# Patient Record
Sex: MALE | Race: WHITE | NOT HISPANIC OR LATINO | Employment: OTHER | URBAN - METROPOLITAN AREA
[De-identification: names, ages, dates, MRNs, and addresses within clinical notes are randomized per-mention and may not be internally consistent; named-entity substitution may affect disease eponyms.]

---

## 2023-01-01 ENCOUNTER — APPOINTMENT (OUTPATIENT)
Dept: ULTRASOUND IMAGING | Facility: CLINIC | Age: 73
DRG: 189 | End: 2023-01-01
Attending: EMERGENCY MEDICINE
Payer: OTHER MISCELLANEOUS

## 2023-01-01 ENCOUNTER — HOSPITAL ENCOUNTER (INPATIENT)
Facility: CLINIC | Age: 73
LOS: 1 days | End: 2023-03-10
Attending: STUDENT IN AN ORGANIZED HEALTH CARE EDUCATION/TRAINING PROGRAM | Admitting: STUDENT IN AN ORGANIZED HEALTH CARE EDUCATION/TRAINING PROGRAM
Payer: OTHER MISCELLANEOUS

## 2023-01-01 ENCOUNTER — MEDICAL CORRESPONDENCE (OUTPATIENT)
Dept: HEALTH INFORMATION MANAGEMENT | Facility: CLINIC | Age: 73
End: 2023-01-01

## 2023-01-01 ENCOUNTER — APPOINTMENT (OUTPATIENT)
Dept: GENERAL RADIOLOGY | Facility: CLINIC | Age: 73
DRG: 189 | End: 2023-01-01
Attending: EMERGENCY MEDICINE
Payer: OTHER MISCELLANEOUS

## 2023-01-01 ENCOUNTER — HOSPITAL ENCOUNTER (INPATIENT)
Facility: CLINIC | Age: 73
LOS: 1 days | Discharge: HOSPICE | DRG: 189 | End: 2023-03-09
Attending: EMERGENCY MEDICINE | Admitting: STUDENT IN AN ORGANIZED HEALTH CARE EDUCATION/TRAINING PROGRAM
Payer: OTHER MISCELLANEOUS

## 2023-01-01 ENCOUNTER — APPOINTMENT (OUTPATIENT)
Dept: CT IMAGING | Facility: CLINIC | Age: 73
DRG: 189 | End: 2023-01-01
Attending: EMERGENCY MEDICINE
Payer: OTHER MISCELLANEOUS

## 2023-01-01 ENCOUNTER — TRANSFERRED RECORDS (OUTPATIENT)
Dept: HEALTH INFORMATION MANAGEMENT | Facility: CLINIC | Age: 73
End: 2023-01-01

## 2023-01-01 VITALS
RESPIRATION RATE: 21 BRPM | TEMPERATURE: 97.9 F | OXYGEN SATURATION: 96 % | BODY MASS INDEX: 17.14 KG/M2 | SYSTOLIC BLOOD PRESSURE: 127 MMHG | HEIGHT: 69 IN | HEART RATE: 68 BPM | WEIGHT: 115.74 LBS | DIASTOLIC BLOOD PRESSURE: 65 MMHG

## 2023-01-01 VITALS — RESPIRATION RATE: 23 BRPM

## 2023-01-01 DIAGNOSIS — A41.9 SEPSIS WITH ACUTE RENAL FAILURE WITHOUT SEPTIC SHOCK, DUE TO UNSPECIFIED ORGANISM, UNSPECIFIED ACUTE RENAL FAILURE TYPE (H): ICD-10-CM

## 2023-01-01 DIAGNOSIS — Z20.822 LAB TEST NEGATIVE FOR COVID-19 VIRUS: ICD-10-CM

## 2023-01-01 DIAGNOSIS — R65.20 SEPSIS WITH ACUTE RENAL FAILURE WITHOUT SEPTIC SHOCK, DUE TO UNSPECIFIED ORGANISM, UNSPECIFIED ACUTE RENAL FAILURE TYPE (H): ICD-10-CM

## 2023-01-01 DIAGNOSIS — N17.9 SEPSIS WITH ACUTE RENAL FAILURE WITHOUT SEPTIC SHOCK, DUE TO UNSPECIFIED ORGANISM, UNSPECIFIED ACUTE RENAL FAILURE TYPE (H): ICD-10-CM

## 2023-01-01 DIAGNOSIS — J96.01 ACUTE RESPIRATORY FAILURE WITH HYPOXIA (H): ICD-10-CM

## 2023-01-01 LAB
ALBUMIN SERPL BCG-MCNC: 3.9 G/DL (ref 3.5–5.2)
ALBUMIN UR-MCNC: 70 MG/DL
ALLEN'S TEST: YES
ALP SERPL-CCNC: 112 U/L (ref 40–129)
ALT SERPL W P-5'-P-CCNC: 18 U/L (ref 10–50)
ANION GAP SERPL CALCULATED.3IONS-SCNC: 17 MMOL/L (ref 7–15)
APPEARANCE UR: ABNORMAL
AST SERPL W P-5'-P-CCNC: 29 U/L (ref 10–50)
ATRIAL RATE - MUSE: 70 BPM
BASE EXCESS BLDA CALC-SCNC: -3.6 MMOL/L (ref -9–1.8)
BASOPHILS # BLD AUTO: 0 10E3/UL (ref 0–0.2)
BASOPHILS NFR BLD AUTO: 0 %
BILIRUB SERPL-MCNC: 1 MG/DL
BILIRUB UR QL STRIP: NEGATIVE
BUN SERPL-MCNC: 70.8 MG/DL (ref 8–23)
CA-I BLD-MCNC: 5.1 MG/DL (ref 4.4–5.2)
CALCIUM SERPL-MCNC: 9.5 MG/DL (ref 8.8–10.2)
CHLORIDE SERPL-SCNC: 105 MMOL/L (ref 98–107)
COLOR UR AUTO: YELLOW
CPB POCT: NO
CREAT SERPL-MCNC: 4.07 MG/DL (ref 0.67–1.17)
DEPRECATED HCO3 PLAS-SCNC: 19 MMOL/L (ref 22–29)
DIASTOLIC BLOOD PRESSURE - MUSE: NORMAL MMHG
EOSINOPHIL # BLD AUTO: 0 10E3/UL (ref 0–0.7)
EOSINOPHIL NFR BLD AUTO: 0 %
ERYTHROCYTE [DISTWIDTH] IN BLOOD BY AUTOMATED COUNT: 21.4 % (ref 10–15)
FLUAV RNA SPEC QL NAA+PROBE: NEGATIVE
FLUBV RNA RESP QL NAA+PROBE: NEGATIVE
GFR SERPL CREATININE-BSD FRML MDRD: 15 ML/MIN/1.73M2
GLUCOSE BLD-MCNC: 198 MG/DL (ref 70–99)
GLUCOSE BLDC GLUCOMTR-MCNC: 167 MG/DL (ref 70–99)
GLUCOSE BLDC GLUCOMTR-MCNC: 175 MG/DL (ref 70–99)
GLUCOSE BLDC GLUCOMTR-MCNC: 215 MG/DL (ref 70–99)
GLUCOSE BLDC GLUCOMTR-MCNC: 224 MG/DL (ref 70–99)
GLUCOSE BLDC GLUCOMTR-MCNC: 238 MG/DL (ref 70–99)
GLUCOSE SERPL-MCNC: 200 MG/DL (ref 70–99)
GLUCOSE UR STRIP-MCNC: 70 MG/DL
HCO3 BLD-SCNC: 23 MMOL/L (ref 21–28)
HCO3 BLDV-SCNC: 21 MMOL/L (ref 21–28)
HCO3 BLDV-SCNC: 21 MMOL/L (ref 21–28)
HCT VFR BLD AUTO: 40.7 % (ref 40–53)
HCT VFR BLD CALC: 40 % (ref 40–53)
HGB BLD-MCNC: 12.9 G/DL (ref 13.3–17.7)
HGB BLD-MCNC: 13.6 G/DL (ref 13.3–17.7)
HGB UR QL STRIP: ABNORMAL
HOLD SPECIMEN: NORMAL
HOLD SPECIMEN: NORMAL
HYALINE CASTS: 21 /LPF
IMM GRANULOCYTES # BLD: 0 10E3/UL
IMM GRANULOCYTES NFR BLD: 0 %
INTERPRETATION ECG - MUSE: NORMAL
KETONES UR STRIP-MCNC: NEGATIVE MG/DL
LACTATE BLD-SCNC: 2.2 MMOL/L
LACTATE SERPL-SCNC: 2.4 MMOL/L (ref 0.7–2)
LACTATE SERPL-SCNC: 4.7 MMOL/L (ref 0.7–2)
LEUKOCYTE ESTERASE UR QL STRIP: NEGATIVE
LYMPHOCYTES # BLD AUTO: 0.7 10E3/UL (ref 0.8–5.3)
LYMPHOCYTES NFR BLD AUTO: 6 %
MCH RBC QN AUTO: 25.5 PG (ref 26.5–33)
MCHC RBC AUTO-ENTMCNC: 31.7 G/DL (ref 31.5–36.5)
MCV RBC AUTO: 81 FL (ref 78–100)
MONOCYTES # BLD AUTO: 0.5 10E3/UL (ref 0–1.3)
MONOCYTES NFR BLD AUTO: 4 %
MUCOUS THREADS #/AREA URNS LPF: PRESENT /LPF
NEUTROPHILS # BLD AUTO: 11.3 10E3/UL (ref 1.6–8.3)
NEUTROPHILS NFR BLD AUTO: 90 %
NITRATE UR QL: NEGATIVE
NRBC # BLD AUTO: 0 10E3/UL
NRBC BLD AUTO-RTO: 0 /100
O2/TOTAL GAS SETTING VFR VENT: 50 %
P AXIS - MUSE: 90 DEGREES
PCO2 BLD: 44 MM HG (ref 35–45)
PCO2 BLDV: 40 MM HG (ref 40–50)
PCO2 BLDV: 40 MM HG (ref 40–50)
PH BLD: 7.32 [PH] (ref 7.35–7.45)
PH BLDV: 7.32 [PH] (ref 7.32–7.43)
PH BLDV: 7.34 [PH] (ref 7.32–7.43)
PH UR STRIP: 5.5 [PH] (ref 5–7)
PLATELET # BLD AUTO: 298 10E3/UL (ref 150–450)
PO2 BLD: 34 MM HG (ref 80–105)
PO2 BLDV: 57 MM HG (ref 25–47)
PO2 BLDV: 59 MM HG (ref 25–47)
POTASSIUM BLD-SCNC: 5.6 MMOL/L (ref 3.4–5.3)
POTASSIUM SERPL-SCNC: 5.5 MMOL/L (ref 3.4–5.3)
POTASSIUM SERPL-SCNC: 5.7 MMOL/L (ref 3.4–5.3)
PR INTERVAL - MUSE: 170 MS
PROCALCITONIN SERPL IA-MCNC: 1.86 NG/ML
PROT SERPL-MCNC: 7 G/DL (ref 6.4–8.3)
QRS DURATION - MUSE: 116 MS
QT - MUSE: 432 MS
QTC - MUSE: 466 MS
R AXIS - MUSE: -46 DEGREES
RBC # BLD AUTO: 5.05 10E6/UL (ref 4.4–5.9)
RBC URINE: >182 /HPF
RSV RNA SPEC NAA+PROBE: NEGATIVE
SAO2 % BLDV: 88 % (ref 94–100)
SAO2 % BLDV: 88 % (ref 94–100)
SARS-COV-2 RNA RESP QL NAA+PROBE: NEGATIVE
SODIUM BLD-SCNC: 144 MMOL/L (ref 133–144)
SODIUM SERPL-SCNC: 141 MMOL/L (ref 136–145)
SP GR UR STRIP: 1.02 (ref 1–1.03)
SYSTOLIC BLOOD PRESSURE - MUSE: NORMAL MMHG
T AXIS - MUSE: 78 DEGREES
TROPONIN T SERPL HS-MCNC: 53 NG/L
UROBILINOGEN UR STRIP-MCNC: NORMAL MG/DL
VENTRICULAR RATE- MUSE: 70 BPM
WBC # BLD AUTO: 12.6 10E3/UL (ref 4–11)
WBC URINE: 1 /HPF

## 2023-01-01 PROCEDURE — 82962 GLUCOSE BLOOD TEST: CPT

## 2023-01-01 PROCEDURE — 94640 AIRWAY INHALATION TREATMENT: CPT | Mod: 76

## 2023-01-01 PROCEDURE — 87040 BLOOD CULTURE FOR BACTERIA: CPT | Performed by: EMERGENCY MEDICINE

## 2023-01-01 PROCEDURE — 74176 CT ABD & PELVIS W/O CONTRAST: CPT | Mod: 26 | Performed by: RADIOLOGY

## 2023-01-01 PROCEDURE — 85025 COMPLETE CBC W/AUTO DIFF WBC: CPT | Performed by: EMERGENCY MEDICINE

## 2023-01-01 PROCEDURE — 250N000009 HC RX 250

## 2023-01-01 PROCEDURE — 999N000157 HC STATISTIC RCP TIME EA 10 MIN

## 2023-01-01 PROCEDURE — 250N000011 HC RX IP 250 OP 636: Performed by: STUDENT IN AN ORGANIZED HEALTH CARE EDUCATION/TRAINING PROGRAM

## 2023-01-01 PROCEDURE — 82947 ASSAY GLUCOSE BLOOD QUANT: CPT | Performed by: EMERGENCY MEDICINE

## 2023-01-01 PROCEDURE — 250N000013 HC RX MED GY IP 250 OP 250 PS 637: Performed by: EMERGENCY MEDICINE

## 2023-01-01 PROCEDURE — C9803 HOPD COVID-19 SPEC COLLECT: HCPCS | Performed by: EMERGENCY MEDICINE

## 2023-01-01 PROCEDURE — 258N000001 HC RX 258: Performed by: EMERGENCY MEDICINE

## 2023-01-01 PROCEDURE — 250N000011 HC RX IP 250 OP 636

## 2023-01-01 PROCEDURE — 250N000009 HC RX 250: Performed by: EMERGENCY MEDICINE

## 2023-01-01 PROCEDURE — 94640 AIRWAY INHALATION TREATMENT: CPT

## 2023-01-01 PROCEDURE — 93970 EXTREMITY STUDY: CPT

## 2023-01-01 PROCEDURE — 82803 BLOOD GASES ANY COMBINATION: CPT

## 2023-01-01 PROCEDURE — 84145 PROCALCITONIN (PCT): CPT | Performed by: EMERGENCY MEDICINE

## 2023-01-01 PROCEDURE — 93010 ELECTROCARDIOGRAM REPORT: CPT | Mod: GV | Performed by: EMERGENCY MEDICINE

## 2023-01-01 PROCEDURE — 80053 COMPREHEN METABOLIC PANEL: CPT

## 2023-01-01 PROCEDURE — 5A09357 ASSISTANCE WITH RESPIRATORY VENTILATION, LESS THAN 24 CONSECUTIVE HOURS, CONTINUOUS POSITIVE AIRWAY PRESSURE: ICD-10-PCS | Performed by: EMERGENCY MEDICINE

## 2023-01-01 PROCEDURE — 99232 SBSQ HOSP IP/OBS MODERATE 35: CPT | Mod: GC | Performed by: STUDENT IN AN ORGANIZED HEALTH CARE EDUCATION/TRAINING PROGRAM

## 2023-01-01 PROCEDURE — 250N000011 HC RX IP 250 OP 636: Performed by: EMERGENCY MEDICINE

## 2023-01-01 PROCEDURE — 258N000003 HC RX IP 258 OP 636: Performed by: EMERGENCY MEDICINE

## 2023-01-01 PROCEDURE — 93005 ELECTROCARDIOGRAM TRACING: CPT | Performed by: EMERGENCY MEDICINE

## 2023-01-01 PROCEDURE — 84484 ASSAY OF TROPONIN QUANT: CPT | Performed by: EMERGENCY MEDICINE

## 2023-01-01 PROCEDURE — 83605 ASSAY OF LACTIC ACID: CPT | Performed by: EMERGENCY MEDICINE

## 2023-01-01 PROCEDURE — 36415 COLL VENOUS BLD VENIPUNCTURE: CPT | Performed by: EMERGENCY MEDICINE

## 2023-01-01 PROCEDURE — 83605 ASSAY OF LACTIC ACID: CPT

## 2023-01-01 PROCEDURE — 94660 CPAP INITIATION&MGMT: CPT

## 2023-01-01 PROCEDURE — 84132 ASSAY OF SERUM POTASSIUM: CPT | Performed by: EMERGENCY MEDICINE

## 2023-01-01 PROCEDURE — 99223 1ST HOSP IP/OBS HIGH 75: CPT | Mod: GC | Performed by: STUDENT IN AN ORGANIZED HEALTH CARE EDUCATION/TRAINING PROGRAM

## 2023-01-01 PROCEDURE — 81003 URINALYSIS AUTO W/O SCOPE: CPT | Performed by: EMERGENCY MEDICINE

## 2023-01-01 PROCEDURE — 99291 CRITICAL CARE FIRST HOUR: CPT | Mod: 25 | Performed by: EMERGENCY MEDICINE

## 2023-01-01 PROCEDURE — 120N000005 HC R&B MS OVERFLOW UMMC

## 2023-01-01 PROCEDURE — 74176 CT ABD & PELVIS W/O CONTRAST: CPT

## 2023-01-01 PROCEDURE — 82947 ASSAY GLUCOSE BLOOD QUANT: CPT

## 2023-01-01 PROCEDURE — 99291 CRITICAL CARE FIRST HOUR: CPT | Mod: CS,25 | Performed by: EMERGENCY MEDICINE

## 2023-01-01 PROCEDURE — 87637 SARSCOV2&INF A&B&RSV AMP PRB: CPT | Performed by: EMERGENCY MEDICINE

## 2023-01-01 PROCEDURE — 71045 X-RAY EXAM CHEST 1 VIEW: CPT | Mod: 26 | Performed by: RADIOLOGY

## 2023-01-01 PROCEDURE — 96360 HYDRATION IV INFUSION INIT: CPT | Performed by: EMERGENCY MEDICINE

## 2023-01-01 PROCEDURE — 71045 X-RAY EXAM CHEST 1 VIEW: CPT

## 2023-01-01 PROCEDURE — 93970 EXTREMITY STUDY: CPT | Mod: 26 | Performed by: RADIOLOGY

## 2023-01-01 PROCEDURE — 110N000005 HC R&B HOSPICE, ACCENT

## 2023-01-01 PROCEDURE — 82803 BLOOD GASES ANY COMBINATION: CPT | Performed by: EMERGENCY MEDICINE

## 2023-01-01 RX ORDER — PIPERACILLIN SODIUM, TAZOBACTAM SODIUM 2; .25 G/10ML; G/10ML
2.25 INJECTION, POWDER, LYOPHILIZED, FOR SOLUTION INTRAVENOUS EVERY 6 HOURS
Status: DISCONTINUED | OUTPATIENT
Start: 2023-01-01 | End: 2023-01-01

## 2023-01-01 RX ORDER — HYDROMORPHONE HYDROCHLORIDE 5 MG/5ML
1 SOLUTION ORAL
Status: DISCONTINUED | OUTPATIENT
Start: 2023-01-01 | End: 2023-01-01 | Stop reason: HOSPADM

## 2023-01-01 RX ORDER — ACETAMINOPHEN 325 MG/1
650 TABLET ORAL EVERY 6 HOURS PRN
COMMUNITY

## 2023-01-01 RX ORDER — HYDROMORPHONE HYDROCHLORIDE 5 MG/5ML
2 SOLUTION ORAL
Status: DISCONTINUED | OUTPATIENT
Start: 2023-01-01 | End: 2023-03-11 | Stop reason: HOSPADM

## 2023-01-01 RX ORDER — LORAZEPAM 2 MG/ML
0.5 INJECTION INTRAMUSCULAR
Status: DISCONTINUED | OUTPATIENT
Start: 2023-01-01 | End: 2023-03-11 | Stop reason: HOSPADM

## 2023-01-01 RX ORDER — LEVALBUTEROL INHALATION SOLUTION 1.25 MG/3ML
1.25 SOLUTION RESPIRATORY (INHALATION) EVERY 6 HOURS PRN
Status: DISCONTINUED | OUTPATIENT
Start: 2023-01-01 | End: 2023-03-11 | Stop reason: HOSPADM

## 2023-01-01 RX ORDER — HEPARIN SODIUM 5000 [USP'U]/.5ML
5000 INJECTION, SOLUTION INTRAVENOUS; SUBCUTANEOUS EVERY 12 HOURS
Status: DISCONTINUED | OUTPATIENT
Start: 2023-01-01 | End: 2023-01-01

## 2023-01-01 RX ORDER — NALOXONE HYDROCHLORIDE 0.4 MG/ML
0.1 INJECTION, SOLUTION INTRAMUSCULAR; INTRAVENOUS; SUBCUTANEOUS
Status: DISCONTINUED | OUTPATIENT
Start: 2023-01-01 | End: 2023-01-01

## 2023-01-01 RX ORDER — POLYETHYLENE GLYCOL 3350 17 G/17G
17 POWDER, FOR SOLUTION ORAL DAILY PRN
Status: DISCONTINUED | OUTPATIENT
Start: 2023-01-01 | End: 2023-01-01 | Stop reason: HOSPADM

## 2023-01-01 RX ORDER — ALBUTEROL SULFATE 0.83 MG/ML
2.5 SOLUTION RESPIRATORY (INHALATION)
Status: DISCONTINUED | OUTPATIENT
Start: 2023-01-01 | End: 2023-01-01

## 2023-01-01 RX ORDER — GLYCOPYRROLATE 0.2 MG/ML
0.2 INJECTION, SOLUTION INTRAMUSCULAR; INTRAVENOUS EVERY 4 HOURS PRN
Status: DISCONTINUED | OUTPATIENT
Start: 2023-01-01 | End: 2023-03-11 | Stop reason: HOSPADM

## 2023-01-01 RX ORDER — SALIVA STIMULANT COMB. NO.3
1 SPRAY, NON-AEROSOL (ML) MUCOUS MEMBRANE
Status: CANCELLED | OUTPATIENT
Start: 2023-01-01

## 2023-01-01 RX ORDER — IPRATROPIUM BROMIDE AND ALBUTEROL SULFATE 2.5; .5 MG/3ML; MG/3ML
3 SOLUTION RESPIRATORY (INHALATION) ONCE
Status: COMPLETED | OUTPATIENT
Start: 2023-01-01 | End: 2023-01-01

## 2023-01-01 RX ORDER — LORAZEPAM 1 MG/1
1 TABLET ORAL
Status: DISCONTINUED | OUTPATIENT
Start: 2023-01-01 | End: 2023-03-11 | Stop reason: HOSPADM

## 2023-01-01 RX ORDER — LISINOPRIL 10 MG/1
10 TABLET ORAL DAILY
COMMUNITY

## 2023-01-01 RX ORDER — OLANZAPINE 5 MG/1
5 TABLET, ORALLY DISINTEGRATING ORAL EVERY 6 HOURS PRN
Status: DISCONTINUED | OUTPATIENT
Start: 2023-01-01 | End: 2023-01-01 | Stop reason: HOSPADM

## 2023-01-01 RX ORDER — LORAZEPAM 1 MG/1
1 TABLET ORAL
Status: DISCONTINUED | OUTPATIENT
Start: 2023-01-01 | End: 2023-01-01 | Stop reason: HOSPADM

## 2023-01-01 RX ORDER — CARBOXYMETHYLCELLULOSE SODIUM 5 MG/ML
1 SOLUTION/ DROPS OPHTHALMIC
Status: DISCONTINUED | OUTPATIENT
Start: 2023-01-01 | End: 2023-01-01

## 2023-01-01 RX ORDER — GLYCOPYRROLATE 1 MG/1
2 TABLET ORAL EVERY 4 HOURS PRN
Status: DISCONTINUED | OUTPATIENT
Start: 2023-01-01 | End: 2023-03-11 | Stop reason: HOSPADM

## 2023-01-01 RX ORDER — ALBUTEROL SULFATE 0.83 MG/ML
2.5 SOLUTION RESPIRATORY (INHALATION)
Status: CANCELLED | OUTPATIENT
Start: 2023-01-01

## 2023-01-01 RX ORDER — DEXTROSE MONOHYDRATE 25 G/50ML
25 INJECTION, SOLUTION INTRAVENOUS ONCE
Status: COMPLETED | OUTPATIENT
Start: 2023-01-01 | End: 2023-01-01

## 2023-01-01 RX ORDER — NALOXONE HYDROCHLORIDE 0.4 MG/ML
0.1 INJECTION, SOLUTION INTRAMUSCULAR; INTRAVENOUS; SUBCUTANEOUS
Status: CANCELLED | OUTPATIENT
Start: 2023-01-01

## 2023-01-01 RX ORDER — MINERAL OIL/HYDROPHIL PETROLAT
OINTMENT (GRAM) TOPICAL
Status: CANCELLED | OUTPATIENT
Start: 2023-01-01

## 2023-01-01 RX ORDER — LORAZEPAM 2 MG/ML
0.5 INJECTION INTRAMUSCULAR
Status: CANCELLED | OUTPATIENT
Start: 2023-01-01

## 2023-01-01 RX ORDER — HYDROMORPHONE HYDROCHLORIDE 1 MG/ML
1 SOLUTION ORAL
Status: DISCONTINUED | OUTPATIENT
Start: 2023-01-01 | End: 2023-03-11 | Stop reason: HOSPADM

## 2023-01-01 RX ORDER — HALOPERIDOL 5 MG/ML
1 INJECTION INTRAMUSCULAR
Status: DISCONTINUED | OUTPATIENT
Start: 2023-01-01 | End: 2023-03-11 | Stop reason: HOSPADM

## 2023-01-01 RX ORDER — DULOXETINE 40 MG/1
1 CAPSULE, DELAYED RELEASE ORAL DAILY
COMMUNITY

## 2023-01-01 RX ORDER — DEXTROSE MONOHYDRATE 25 G/50ML
25-50 INJECTION, SOLUTION INTRAVENOUS
Status: DISCONTINUED | OUTPATIENT
Start: 2023-01-01 | End: 2023-01-01 | Stop reason: HOSPADM

## 2023-01-01 RX ORDER — GLYCOPYRROLATE 1 MG/1
2 TABLET ORAL EVERY 4 HOURS PRN
Status: DISCONTINUED | OUTPATIENT
Start: 2023-01-01 | End: 2023-01-01 | Stop reason: HOSPADM

## 2023-01-01 RX ORDER — ACETAMINOPHEN 325 MG/1
650 TABLET ORAL EVERY 6 HOURS PRN
Status: DISCONTINUED | OUTPATIENT
Start: 2023-01-01 | End: 2023-01-01 | Stop reason: HOSPADM

## 2023-01-01 RX ORDER — SALIVA STIMULANT COMB. NO.3
1 SPRAY, NON-AEROSOL (ML) MUCOUS MEMBRANE
Status: DISCONTINUED | OUTPATIENT
Start: 2023-01-01 | End: 2023-01-01 | Stop reason: HOSPADM

## 2023-01-01 RX ORDER — HYDROMORPHONE HYDROCHLORIDE 1 MG/ML
0.5 INJECTION, SOLUTION INTRAMUSCULAR; INTRAVENOUS; SUBCUTANEOUS
Status: DISCONTINUED | OUTPATIENT
Start: 2023-01-01 | End: 2023-01-01 | Stop reason: HOSPADM

## 2023-01-01 RX ORDER — NALOXONE HYDROCHLORIDE 0.4 MG/ML
0.2 INJECTION, SOLUTION INTRAMUSCULAR; INTRAVENOUS; SUBCUTANEOUS
Status: DISCONTINUED | OUTPATIENT
Start: 2023-01-01 | End: 2023-01-01

## 2023-01-01 RX ORDER — HYDROMORPHONE HYDROCHLORIDE 1 MG/ML
0.5 INJECTION, SOLUTION INTRAMUSCULAR; INTRAVENOUS; SUBCUTANEOUS
Status: DISCONTINUED | OUTPATIENT
Start: 2023-01-01 | End: 2023-03-11 | Stop reason: HOSPADM

## 2023-01-01 RX ORDER — AMOXICILLIN 250 MG
2 CAPSULE ORAL 2 TIMES DAILY PRN
Status: DISCONTINUED | OUTPATIENT
Start: 2023-01-01 | End: 2023-01-01 | Stop reason: HOSPADM

## 2023-01-01 RX ORDER — HYDROMORPHONE HYDROCHLORIDE 2 MG/1
2 TABLET ORAL
Status: DISCONTINUED | OUTPATIENT
Start: 2023-01-01 | End: 2023-03-11 | Stop reason: HOSPADM

## 2023-01-01 RX ORDER — HYDROMORPHONE HYDROCHLORIDE 5 MG/5ML
1 SOLUTION ORAL
Status: CANCELLED | OUTPATIENT
Start: 2023-01-01

## 2023-01-01 RX ORDER — IPRATROPIUM BROMIDE AND ALBUTEROL SULFATE 2.5; .5 MG/3ML; MG/3ML
3 SOLUTION RESPIRATORY (INHALATION) ONCE
Status: CANCELLED | OUTPATIENT
Start: 2023-01-01 | End: 2023-01-01

## 2023-01-01 RX ORDER — HYDROMORPHONE HYDROCHLORIDE 1 MG/ML
0.5 INJECTION, SOLUTION INTRAMUSCULAR; INTRAVENOUS; SUBCUTANEOUS
Status: CANCELLED | OUTPATIENT
Start: 2023-01-01

## 2023-01-01 RX ORDER — CARBOXYMETHYLCELLULOSE SODIUM 5 MG/ML
1-2 SOLUTION/ DROPS OPHTHALMIC
Status: DISCONTINUED | OUTPATIENT
Start: 2023-01-01 | End: 2023-03-11 | Stop reason: HOSPADM

## 2023-01-01 RX ORDER — MINERAL OIL/HYDROPHIL PETROLAT
OINTMENT (GRAM) TOPICAL
Status: DISCONTINUED | OUTPATIENT
Start: 2023-01-01 | End: 2023-03-11 | Stop reason: HOSPADM

## 2023-01-01 RX ORDER — BUPROPION HYDROCHLORIDE 200 MG/1
200 TABLET, EXTENDED RELEASE ORAL 2 TIMES DAILY
COMMUNITY

## 2023-01-01 RX ORDER — LORAZEPAM 2 MG/ML
0.5 INJECTION INTRAMUSCULAR
Status: DISCONTINUED | OUTPATIENT
Start: 2023-01-01 | End: 2023-01-01 | Stop reason: HOSPADM

## 2023-01-01 RX ORDER — LIDOCAINE 40 MG/G
CREAM TOPICAL
Status: DISCONTINUED | OUTPATIENT
Start: 2023-01-01 | End: 2023-01-01 | Stop reason: HOSPADM

## 2023-01-01 RX ORDER — HYDROMORPHONE HYDROCHLORIDE 2 MG/1
2 TABLET ORAL
Status: CANCELLED | OUTPATIENT
Start: 2023-01-01

## 2023-01-01 RX ORDER — AMOXICILLIN 250 MG
1 CAPSULE ORAL 2 TIMES DAILY PRN
Status: DISCONTINUED | OUTPATIENT
Start: 2023-01-01 | End: 2023-01-01 | Stop reason: HOSPADM

## 2023-01-01 RX ORDER — IPRATROPIUM BROMIDE AND ALBUTEROL SULFATE 2.5; .5 MG/3ML; MG/3ML
3 SOLUTION RESPIRATORY (INHALATION) ONCE
Status: DISCONTINUED | OUTPATIENT
Start: 2023-01-01 | End: 2023-01-01

## 2023-01-01 RX ORDER — CARBIDOPA AND LEVODOPA 25; 100 MG/1; MG/1
1 TABLET ORAL 3 TIMES DAILY
COMMUNITY

## 2023-01-01 RX ORDER — NALOXONE HYDROCHLORIDE 0.4 MG/ML
0.1 INJECTION, SOLUTION INTRAMUSCULAR; INTRAVENOUS; SUBCUTANEOUS
Status: DISCONTINUED | OUTPATIENT
Start: 2023-01-01 | End: 2023-01-01 | Stop reason: HOSPADM

## 2023-01-01 RX ORDER — NALOXONE HYDROCHLORIDE 0.4 MG/ML
0.4 INJECTION, SOLUTION INTRAMUSCULAR; INTRAVENOUS; SUBCUTANEOUS
Status: DISCONTINUED | OUTPATIENT
Start: 2023-01-01 | End: 2023-03-11 | Stop reason: HOSPADM

## 2023-01-01 RX ORDER — CARBOXYMETHYLCELLULOSE SODIUM 5 MG/ML
1-2 SOLUTION/ DROPS OPHTHALMIC
Status: DISCONTINUED | OUTPATIENT
Start: 2023-01-01 | End: 2023-01-01 | Stop reason: HOSPADM

## 2023-01-01 RX ORDER — HYDROMORPHONE HYDROCHLORIDE 5 MG/5ML
2 SOLUTION ORAL
Status: CANCELLED | OUTPATIENT
Start: 2023-01-01

## 2023-01-01 RX ORDER — SALIVA STIMULANT COMB. NO.3
1 SPRAY, NON-AEROSOL (ML) MUCOUS MEMBRANE
Status: DISCONTINUED | OUTPATIENT
Start: 2023-01-01 | End: 2023-03-11 | Stop reason: HOSPADM

## 2023-01-01 RX ORDER — GLYCOPYRROLATE 0.2 MG/ML
0.2 INJECTION, SOLUTION INTRAMUSCULAR; INTRAVENOUS EVERY 4 HOURS PRN
Status: CANCELLED | OUTPATIENT
Start: 2023-01-01

## 2023-01-01 RX ORDER — NICOTINE POLACRILEX 4 MG
15-30 LOZENGE BUCCAL
Status: DISCONTINUED | OUTPATIENT
Start: 2023-01-01 | End: 2023-01-01 | Stop reason: HOSPADM

## 2023-01-01 RX ORDER — AMANTADINE HYDROCHLORIDE 100 MG/1
1 TABLET ORAL DAILY
COMMUNITY

## 2023-01-01 RX ORDER — PREGABALIN 200 MG/1
200 CAPSULE ORAL 2 TIMES DAILY
COMMUNITY

## 2023-01-01 RX ORDER — NALOXONE HYDROCHLORIDE 0.4 MG/ML
0.2 INJECTION, SOLUTION INTRAMUSCULAR; INTRAVENOUS; SUBCUTANEOUS
Status: CANCELLED | OUTPATIENT
Start: 2023-01-01

## 2023-01-01 RX ORDER — OLANZAPINE 5 MG/1
5 TABLET ORAL AT BEDTIME
COMMUNITY

## 2023-01-01 RX ORDER — GLYCOPYRROLATE 1 MG/1
2 TABLET ORAL EVERY 4 HOURS PRN
Status: CANCELLED | OUTPATIENT
Start: 2023-01-01

## 2023-01-01 RX ORDER — ATROPINE SULFATE 10 MG/ML
2 SOLUTION/ DROPS OPHTHALMIC EVERY 4 HOURS PRN
Status: DISCONTINUED | OUTPATIENT
Start: 2023-01-01 | End: 2023-03-11 | Stop reason: HOSPADM

## 2023-01-01 RX ORDER — ACETAMINOPHEN 325 MG/1
650 TABLET ORAL EVERY 6 HOURS PRN
Status: CANCELLED | OUTPATIENT
Start: 2023-01-01

## 2023-01-01 RX ORDER — LORAZEPAM 0.5 MG/1
1 TABLET ORAL
Status: CANCELLED | OUTPATIENT
Start: 2023-01-01

## 2023-01-01 RX ORDER — HYDROMORPHONE HYDROCHLORIDE 5 MG/5ML
2 SOLUTION ORAL
Status: DISCONTINUED | OUTPATIENT
Start: 2023-01-01 | End: 2023-01-01 | Stop reason: HOSPADM

## 2023-01-01 RX ORDER — ALBUTEROL SULFATE 0.83 MG/ML
2.5 SOLUTION RESPIRATORY (INHALATION)
Status: DISCONTINUED | OUTPATIENT
Start: 2023-01-01 | End: 2023-01-01 | Stop reason: HOSPADM

## 2023-01-01 RX ORDER — GLYCOPYRROLATE 0.2 MG/ML
0.2 INJECTION, SOLUTION INTRAMUSCULAR; INTRAVENOUS EVERY 4 HOURS PRN
Status: DISCONTINUED | OUTPATIENT
Start: 2023-01-01 | End: 2023-01-01 | Stop reason: HOSPADM

## 2023-01-01 RX ORDER — AMIODARONE HYDROCHLORIDE 200 MG/1
200 TABLET ORAL DAILY
COMMUNITY

## 2023-01-01 RX ORDER — NALOXONE HYDROCHLORIDE 0.4 MG/ML
0.2 INJECTION, SOLUTION INTRAMUSCULAR; INTRAVENOUS; SUBCUTANEOUS
Status: DISCONTINUED | OUTPATIENT
Start: 2023-01-01 | End: 2023-03-11 | Stop reason: HOSPADM

## 2023-01-01 RX ORDER — HYDROMORPHONE HYDROCHLORIDE 1 MG/ML
0.3 INJECTION, SOLUTION INTRAMUSCULAR; INTRAVENOUS; SUBCUTANEOUS
Status: DISCONTINUED | OUTPATIENT
Start: 2023-01-01 | End: 2023-03-11 | Stop reason: HOSPADM

## 2023-01-01 RX ORDER — HYDROMORPHONE HYDROCHLORIDE 1 MG/ML
0.3 INJECTION, SOLUTION INTRAMUSCULAR; INTRAVENOUS; SUBCUTANEOUS
Status: CANCELLED | OUTPATIENT
Start: 2023-01-01

## 2023-01-01 RX ORDER — HYDROMORPHONE HYDROCHLORIDE 2 MG/1
2 TABLET ORAL
Status: DISCONTINUED | OUTPATIENT
Start: 2023-01-01 | End: 2023-01-01 | Stop reason: HOSPADM

## 2023-01-01 RX ORDER — CALCIUM GLUCONATE 20 MG/ML
1 INJECTION, SOLUTION INTRAVENOUS ONCE
Status: COMPLETED | OUTPATIENT
Start: 2023-01-01 | End: 2023-01-01

## 2023-01-01 RX ORDER — METOPROLOL SUCCINATE 50 MG/1
25 TABLET, EXTENDED RELEASE ORAL DAILY
COMMUNITY

## 2023-01-01 RX ORDER — ALBUTEROL SULFATE 0.83 MG/ML
SOLUTION RESPIRATORY (INHALATION)
Status: COMPLETED
Start: 2023-01-01 | End: 2023-01-01

## 2023-01-01 RX ORDER — ACETAMINOPHEN 650 MG/1
650 SUPPOSITORY RECTAL EVERY 6 HOURS PRN
Status: DISCONTINUED | OUTPATIENT
Start: 2023-01-01 | End: 2023-01-01 | Stop reason: HOSPADM

## 2023-01-01 RX ORDER — HALOPERIDOL 5 MG/ML
1 INJECTION INTRAMUSCULAR
Status: DISCONTINUED | OUTPATIENT
Start: 2023-01-01 | End: 2023-01-01 | Stop reason: HOSPADM

## 2023-01-01 RX ORDER — HALOPERIDOL 5 MG/ML
1 INJECTION INTRAMUSCULAR
Status: CANCELLED | OUTPATIENT
Start: 2023-01-01

## 2023-01-01 RX ORDER — ATORVASTATIN CALCIUM 40 MG/1
40 TABLET, FILM COATED ORAL DAILY
COMMUNITY

## 2023-01-01 RX ORDER — PIPERACILLIN SODIUM, TAZOBACTAM SODIUM 3; .375 G/15ML; G/15ML
3.38 INJECTION, POWDER, LYOPHILIZED, FOR SOLUTION INTRAVENOUS EVERY 6 HOURS
Status: DISCONTINUED | OUTPATIENT
Start: 2023-01-01 | End: 2023-01-01

## 2023-01-01 RX ORDER — CARBOXYMETHYLCELLULOSE SODIUM 5 MG/ML
1 SOLUTION/ DROPS OPHTHALMIC
Status: CANCELLED | OUTPATIENT
Start: 2023-01-01

## 2023-01-01 RX ORDER — MINERAL OIL/HYDROPHIL PETROLAT
OINTMENT (GRAM) TOPICAL
Status: DISCONTINUED | OUTPATIENT
Start: 2023-01-01 | End: 2023-01-01 | Stop reason: HOSPADM

## 2023-01-01 RX ORDER — NICOTINE 21 MG/24HR
1 PATCH, TRANSDERMAL 24 HOURS TRANSDERMAL EVERY 24 HOURS
COMMUNITY

## 2023-01-01 RX ORDER — HYDROMORPHONE HYDROCHLORIDE 1 MG/ML
0.3 INJECTION, SOLUTION INTRAMUSCULAR; INTRAVENOUS; SUBCUTANEOUS
Status: DISCONTINUED | OUTPATIENT
Start: 2023-01-01 | End: 2023-01-01 | Stop reason: HOSPADM

## 2023-01-01 RX ORDER — OLANZAPINE 5 MG/1
5 TABLET, ORALLY DISINTEGRATING ORAL EVERY 6 HOURS PRN
Status: DISCONTINUED | OUTPATIENT
Start: 2023-01-01 | End: 2023-03-11 | Stop reason: HOSPADM

## 2023-01-01 RX ORDER — OMEPRAZOLE 10 MG/1
10 CAPSULE, DELAYED RELEASE ORAL DAILY
COMMUNITY

## 2023-01-01 RX ORDER — CARBOXYMETHYLCELLULOSE SODIUM 5 MG/ML
1-2 SOLUTION/ DROPS OPHTHALMIC
Status: CANCELLED | OUTPATIENT
Start: 2023-01-01

## 2023-01-01 RX ORDER — SODIUM CHLORIDE 9 MG/ML
INJECTION, SOLUTION INTRAVENOUS CONTINUOUS
Status: DISCONTINUED | OUTPATIENT
Start: 2023-01-01 | End: 2023-01-01

## 2023-01-01 RX ORDER — OLANZAPINE 5 MG/1
5 TABLET, ORALLY DISINTEGRATING ORAL EVERY 6 HOURS PRN
Status: CANCELLED | OUTPATIENT
Start: 2023-01-01

## 2023-01-01 RX ORDER — NALOXONE HYDROCHLORIDE 0.4 MG/ML
0.2 INJECTION, SOLUTION INTRAMUSCULAR; INTRAVENOUS; SUBCUTANEOUS
Status: DISCONTINUED | OUTPATIENT
Start: 2023-01-01 | End: 2023-01-01 | Stop reason: HOSPADM

## 2023-01-01 RX ADMIN — ALBUTEROL SULFATE 2.5 MG: 0.83 SOLUTION RESPIRATORY (INHALATION) at 07:39

## 2023-01-01 RX ADMIN — HYDROMORPHONE HYDROCHLORIDE 0.5 MG: 1 INJECTION, SOLUTION INTRAMUSCULAR; INTRAVENOUS; SUBCUTANEOUS at 15:30

## 2023-01-01 RX ADMIN — HYDROMORPHONE HYDROCHLORIDE 0.5 MG: 1 INJECTION, SOLUTION INTRAMUSCULAR; INTRAVENOUS; SUBCUTANEOUS at 06:48

## 2023-01-01 RX ADMIN — SODIUM CHLORIDE 1000 ML: 9 INJECTION, SOLUTION INTRAVENOUS at 07:05

## 2023-01-01 RX ADMIN — HYDROMORPHONE HYDROCHLORIDE 0.5 MG: 1 INJECTION, SOLUTION INTRAMUSCULAR; INTRAVENOUS; SUBCUTANEOUS at 12:49

## 2023-01-01 RX ADMIN — HYDROMORPHONE HYDROCHLORIDE 0.5 MG: 1 INJECTION, SOLUTION INTRAMUSCULAR; INTRAVENOUS; SUBCUTANEOUS at 01:33

## 2023-01-01 RX ADMIN — HYDROMORPHONE HYDROCHLORIDE 0.5 MG: 1 INJECTION, SOLUTION INTRAMUSCULAR; INTRAVENOUS; SUBCUTANEOUS at 05:05

## 2023-01-01 RX ADMIN — GLYCOPYRROLATE 0.2 MG: 0.2 INJECTION INTRAMUSCULAR; INTRAVENOUS at 12:49

## 2023-01-01 RX ADMIN — HYDROMORPHONE HYDROCHLORIDE 0.5 MG: 1 INJECTION, SOLUTION INTRAMUSCULAR; INTRAVENOUS; SUBCUTANEOUS at 13:07

## 2023-01-01 RX ADMIN — LORAZEPAM 0.5 MG: 2 INJECTION INTRAMUSCULAR; INTRAVENOUS at 20:05

## 2023-01-01 RX ADMIN — ALBUTEROL SULFATE 2.5 MG: 2.5 SOLUTION RESPIRATORY (INHALATION) at 07:39

## 2023-01-01 RX ADMIN — CALCIUM GLUCONATE 1 G: 20 INJECTION, SOLUTION INTRAVENOUS at 08:29

## 2023-01-01 RX ADMIN — HYDROMORPHONE HYDROCHLORIDE 0.5 MG: 1 INJECTION, SOLUTION INTRAMUSCULAR; INTRAVENOUS; SUBCUTANEOUS at 17:15

## 2023-01-01 RX ADMIN — GLYCOPYRROLATE 0.2 MG: 0.2 INJECTION INTRAMUSCULAR; INTRAVENOUS at 08:52

## 2023-01-01 RX ADMIN — HYDROMORPHONE HYDROCHLORIDE 0.5 MG: 1 INJECTION, SOLUTION INTRAMUSCULAR; INTRAVENOUS; SUBCUTANEOUS at 15:00

## 2023-01-01 RX ADMIN — SODIUM CHLORIDE: 9 INJECTION, SOLUTION INTRAVENOUS at 09:53

## 2023-01-01 RX ADMIN — VANCOMYCIN HYDROCHLORIDE 1250 MG: 10 INJECTION, POWDER, LYOPHILIZED, FOR SOLUTION INTRAVENOUS at 09:51

## 2023-01-01 RX ADMIN — GLYCOPYRROLATE 0.2 MG: 0.2 INJECTION INTRAMUSCULAR; INTRAVENOUS at 17:20

## 2023-01-01 RX ADMIN — AZITHROMYCIN MONOHYDRATE 500 MG: 500 INJECTION, POWDER, LYOPHILIZED, FOR SOLUTION INTRAVENOUS at 08:41

## 2023-01-01 RX ADMIN — DEXTROSE MONOHYDRATE 300 ML: 100 INJECTION, SOLUTION INTRAVENOUS at 08:50

## 2023-01-01 RX ADMIN — DEXTROSE MONOHYDRATE 25 G: 25 INJECTION, SOLUTION INTRAVENOUS at 08:35

## 2023-01-01 RX ADMIN — PIPERACILLIN AND TAZOBACTAM 3.38 G: 3; .375 INJECTION, POWDER, LYOPHILIZED, FOR SOLUTION INTRAVENOUS at 08:05

## 2023-01-01 RX ADMIN — LORAZEPAM 0.5 MG: 2 INJECTION INTRAMUSCULAR; INTRAVENOUS at 02:00

## 2023-01-01 RX ADMIN — HYDROMORPHONE HYDROCHLORIDE 0.5 MG: 1 INJECTION, SOLUTION INTRAMUSCULAR; INTRAVENOUS; SUBCUTANEOUS at 08:52

## 2023-01-01 RX ADMIN — LORAZEPAM 0.5 MG: 2 INJECTION INTRAMUSCULAR; INTRAVENOUS at 14:21

## 2023-01-01 RX ADMIN — HYDROMORPHONE HYDROCHLORIDE 0.5 MG: 1 INJECTION, SOLUTION INTRAMUSCULAR; INTRAVENOUS; SUBCUTANEOUS at 02:24

## 2023-01-01 RX ADMIN — HUMAN INSULIN 5.25 UNITS: 100 INJECTION, SOLUTION SUBCUTANEOUS at 08:35

## 2023-01-01 RX ADMIN — LORAZEPAM 0.5 MG: 2 INJECTION INTRAMUSCULAR; INTRAVENOUS at 11:42

## 2023-01-01 RX ADMIN — HYDROMORPHONE HYDROCHLORIDE 0.5 MG: 1 INJECTION, SOLUTION INTRAMUSCULAR; INTRAVENOUS; SUBCUTANEOUS at 16:41

## 2023-01-01 RX ADMIN — SODIUM CHLORIDE: 9 INJECTION, SOLUTION INTRAVENOUS at 12:00

## 2023-01-01 RX ADMIN — HYDROMORPHONE HYDROCHLORIDE 0.5 MG: 1 INJECTION, SOLUTION INTRAMUSCULAR; INTRAVENOUS; SUBCUTANEOUS at 10:56

## 2023-01-01 RX ADMIN — IPRATROPIUM BROMIDE AND ALBUTEROL SULFATE 3 ML: .5; 3 SOLUTION RESPIRATORY (INHALATION) at 13:11

## 2023-01-01 RX ADMIN — LORAZEPAM 0.5 MG: 2 INJECTION INTRAMUSCULAR; INTRAVENOUS at 18:03

## 2023-01-01 ASSESSMENT — ACTIVITIES OF DAILY LIVING (ADL)
ADLS_ACUITY_SCORE: 35

## 2023-03-09 PROBLEM — N17.9 SEPSIS WITH ACUTE RENAL FAILURE WITHOUT SEPTIC SHOCK, DUE TO UNSPECIFIED ORGANISM, UNSPECIFIED ACUTE RENAL FAILURE TYPE (H): Status: ACTIVE | Noted: 2023-01-01

## 2023-03-09 PROBLEM — R65.20 SEPSIS WITH ACUTE RENAL FAILURE WITHOUT SEPTIC SHOCK, DUE TO UNSPECIFIED ORGANISM, UNSPECIFIED ACUTE RENAL FAILURE TYPE (H): Status: ACTIVE | Noted: 2023-01-01

## 2023-03-09 PROBLEM — J96.01 ACUTE RESPIRATORY FAILURE WITH HYPOXIA (H): Status: ACTIVE | Noted: 2023-01-01

## 2023-03-09 PROBLEM — A41.9 SEPSIS WITH ACUTE RENAL FAILURE WITHOUT SEPTIC SHOCK, DUE TO UNSPECIFIED ORGANISM, UNSPECIFIED ACUTE RENAL FAILURE TYPE (H): Status: ACTIVE | Noted: 2023-01-01

## 2023-03-09 NOTE — PROGRESS NOTES
Referral Received - [facility]        [facility] would like to thank you for the [facility] referral.     Referral received and initial insurance information sent to  Hospice intake for review.     We are determining your patient's eligibility with a medical director at this time.     Our plan is to visit your patient as soon as possible. We will connect with the primary care team shortly to collect more information on the patient's progression. Thank you for your patience.           EVIE Vu, [facility]  Hospice Social Worker  [facility]  Monday -Friday 8AM-5PM  Work number: [phone]  [facility]  24/7 line: [phone]  [email or web address]  [email or web address]

## 2023-03-09 NOTE — CONSULTS
[facility]    Consult Note - [facility]    ______________________________________________________________________    [facility] 24/7 Contact Number: [phone]    - Providers: Please contact [facility] with changes in orders or clinical plan of care   - Nursing: Please contact [facility] with significant changes in patient condition    Hospice will notify the care team (including the hospitalist) to confirm date of inpatient hospice (GIP) admission.    New Epic encounter will not be created until hospice completes admission.   ______________________________________________________________________        Hospice Diagnosis: Acute hypoxic respiraotry     Indication for Inpatient Hospice: ARDS/Pain      Plan of Care Discussed with the Following:   - Nurse: Avis  - Hospitalist/Rounding Provider:    - Vincent's Family/Preferred Contact: Spouse, Rebecca  - Hospice Provider: Dr. Armando Laguna    NOTE:  Met with spouse, Rebecca and reviewed [facility] hospice benefit and answered questions.  Electronic consent signed by Rebecca.      Updated provider.      Emotional and grief support provided to spouse and daughter.      Thank you for your partnership and the compassionate care you are providing to this patient and family.    Please reach out to cell listed below between 9a-9pm with any questions or concerns.    VINICIO Rose, RN  Clinical Nurse Liaison [facility]  Cell: [phone]  [facility]  Office: [phone]  Email [email or web address]    [email or web address]

## 2023-03-09 NOTE — H&P
History and Physical - [facility]     Date of Admission:  03/09/23    Assessment & Plan   Vincent Escobar is a 72 year old male who is being transitioned to inpatient hospice on 03/09/23. Please see the discharge summary from the same date for more details of his hospital course.    # Acute hypoxic respiratory failure  # Progressive supranuclear palsy  # Alzheimer's   # Parkinson's disease   # Cachexia    # Goals of care  Multifocal nodular and groundglass opacities at the lung bases on CT concerning for infection. Per patient's wife there has been some concern that he has been aspirating.  LE US negative for DVT. Given plan to initiate hospice this upcoming Tuesday, wife would like to enroll patient in hospice tonight after daughter able to come to hospital.   - Will continue BiPAP for now until family able to be with patient   - Labs and other interventions not in line with GOC per his wife Silvia   - Inpatient hospice team consulted   - Comfort care orders initiated        Diet:   regular as tolerated  Prieto Catheter: Not present  Lines: None     Code Status:   DNR/DNI  Expected Discharge: working on discharge with hospice    Kayla Garcia MD  PGY-1 Internal Medicine   [facility]     Securely message with [employer] Protectmore info)  Text page via [employer] Paging/Directory     ______________________________________________________________________    Chief Complaint   Transitioning to inpatient hospice    History of Present Illness   Vincent Escobar is a 72 year old male who is being transitioned to inpatient hospice.  Please see the discharge summary from the same date for more details; a brief summary of his current symptoms is included here.    Vincent was admitted with acute hypoxic respiratory failure, requiring BiPAP. He was shortly thereafter transitioned to hospice as this was planned to happen this Tuesday regardless.     Physical Exam   Vital Signs:                    Weight: 0 lbs 0 oz    Physical exam deferred given  current goals of care    Medical Decision Making

## 2023-03-09 NOTE — H&P
[facility]    History and Physical - Medicine Service, [place] TEAM 1       Date of Admission:  3/9/2023    Assessment & Plan      Vincent Escobar is a 72 year old male with PMH of Paroxysmal afib/flutter, Parkinson's disease, T2DM, HTN, HLD, Alzheimer's, progressive supranuclear palsy who was admitted on 3/9/2023 for acute hypoxic respiratory failure. Given plan to enroll in hospice on Tuesday and acute decline, family has elected to enroll in hospice while in the hospital.     # Acute hypoxic respiratory failure  # Progressive supranuclear palsy  # Alzheimer's   # Parkinson's disease   # Cachexia    # Goals of care  Multifocal nodular and groundglass opacities at the lung bases on CT concerning for infection. Per patient's wife there has been some concern that he has been aspirating.  LE US negative for DVT. Given plan to initiate hospice this upcoming Tuesday, wife would like to enroll patient in hospice tonight after daughter able to come to hospital.   - Will continue BiPAP for now until family able to be with patient   - Labs and other interventions not in line with GOC per his wife Silvia   - Inpatient hospice team consulted   - Comfort care orders initiated     # Lactic acidosis   # Hyperkalemia  - Pursuing hospice, no additional workup/intervention       Chronic problems:  #Afib/flutter  #T2DM  #HTN  #HLD             Diet:   Regular as tolerated   DVT Prophylaxis: None   Prieto Catheter: Not present  Fluids: none   Lines: None     Cardiac Monitoring: None  Code Status: No CPR- Do NOT Intubate      Clinically Significant Risk Factors Present on Admission        # Hyperkalemia: Highest K = 5.7 mmol/L in last 2 days, will monitor as appropriate             # Acute Respiratory Failure: Documented O2 saturation < 91%.  Continue supplemental oxygen as needed     # Cachexia: Estimated body mass index is 17.08 kg/m  as calculated from the following:    Height as of  Glycemic Control Plan of Care    T2DM with current A1c level of 7.1% (6/09/2019). See separate notes, 6/10/2019, for assessment of home diabetes management and education. Patient is s/p trach on 6/03/2019. She was able to communicate by writing. Home diabetes medication: Lantus insulin (vial) 90 units daily at bedtime. Current hospital diabetes medications: Lantus insulin 22 units and very resistant dose of correctional lispro insulin. POC BG range on 6/16/2019: 189-281 mg/dL. POC BG report on 6/17/2019 at time of review: 226, 195 mg/dL. Patient cont on TPN, dextrose 150 g/day and regular insulin increased from 10 to 12 units for the next bag. Noted patient was made NPO since 6/15/2019. Recommendation(s):  1.) Cont on current SC insulin orders/dose: basal lantus and very resistant dose of correctional lispro insulin. Assessment:  Patient is 64year old with past medical history including type 2 diabetes mellitus with neuropathy, sleep apnea, s/p aortic valve replacement, morbid obesity, hypothyroidism, hypertension, chronic CHF and s/p thyroidectomy - was admitted on 6/03/2019 with report of stridor. Noted:  Vocal cord dysfunction. Status post tracheostomy on 6/03/2019. Hypothyroidism. Recent thyroidectomy on 4/11/2019. Morbid obesity. T2DM with current A1c of 8.4% (4/04/2019). Most recent blood glucose values:    Results for Phillip Sharp (MRN [id number]) as of 6/17/2019 14:09   Ref. Range 6/16/2019 05:38 6/16/2019 11:18 6/16/2019 16:32 6/16/2019 18:45 6/16/2019 20:54   GLUCOSE,FAST - POC Latest Ref Range: 70 - 110 mg/dL 211 (H) 281 (H) 189 (H) 216 (H) 191 (H)     Results for Phillip Sharp (MRN [id number]) as of 6/17/2019 14:09   Ref.  Range 6/17/2019 05:31 6/17/2019 11:45   GLUCOSE,FAST - POC Latest Ref Range: 70 - 110 mg/dL 226 (H) 195 (H)     Current A1C: 7.1% (6/09/2019) which is equivalent to estimated average blood glucose of 153 mg/dL during the past 2-3 "this encounter: 1.753 m (5' 9.02\").    Weight as of this encounter: 52.5 kg (115 lb 11.9 oz).           Disposition Plan : Transition to hospice      Expected Discharge Date: 03/15/2023                The patient's care was discussed with the Attending Physician, Dr. Live.    Kayla Garcia MD  Medicine Service, [facility] TEAM 1  [facility]  Securely message with [email or web address] (more info)  Text page via [employer] Paging/Directory   See signed in provider for up to date coverage information  ______________________________________________________________________    Chief Complaint   Hypoxia       History of Present Illness   Vincent Escobar is a 72 year old male who has a history of Paroxysmal afib/flutter, Parkinson's disease, T2DM, HTN, HLD, Alzheimer's, progressive supranuclear palsy and is admitted for acute hypoxic respiratory failure.     History is obtained from the patient's wife Silvia. Tells me that Vincent has been eating and drinking less over the last week. He has had 3 hospitalizations recently and has had progressive decline in functionality with each hospitalization. Had already started setting up hospice with the plan to formally enroll in hospice on Tuesday. Because of this she is interesting in enrolling him in hospice now. Her daughter is an ICU nurse at [employer] who is working today. Silvia has left her daughter a voicemail and she anticipates her daughter will becoming to the hospital as soon as her shift is order. Silvia would like to continue the BiPAP until her daughter can come, but she does not want labs and other interventions at this time because of how uncomfortable they are for Vincent.     Per chart review and sign out, Vincent was recently admitted at [facility] at the end of February. He was thought to have a UTI although culture grew Staph aureus so it was thought he had a transient bacteremia from chronic foot wounds.         Past Medical History    No past " months. Current hospital diabetes medications:  Basal lantus insulin 22 units daily. Correctional lispro insulin ACHS. Very resistant dose. Current bag of TPN with 10 units regular insulin    Total daily dose insulin requirement previous day: 6/16/2019:  Lantus: 18 units  Lispro: 3 units  Regular insulin in TPN. Home diabetes medications: Obtained from patient on 6/06/2019:  Lantus insulin (trach, patient unable to speak but nodded head when I asked if she's on vial insulin). She nodded again when I asked if she's on 90 units of lantus daily at bedtime and she has meter monitoring her blood sugar. Diet: NPO. Goals:  Blood glucose will be within target range of  mg/dL by 6/20/2019.     Education:    _X__  Refer to Diabetes Education Record: 6/10/2019  ___  Education not indicated at this time    Valeria Lomeli RN  Pager: [phone] medical history on file.    Past Surgical History   No past surgical history on file.    Prior to Admission Medications   None        Review of Systems    Review of systems not obtained due to patient factors - mental status    Social History   I have reviewed this patient's social history and updated it with pertinent information if needed.       Family History     Unable to obtain due to: confusion      Physical Exam   Vital Signs: Temp: 97.9  F (36.6  C) Temp src: Axillary BP: 104/57 Pulse: 64   Resp: 21 SpO2: 91 % O2 Device: Non-rebreather mask Oxygen Delivery: 12 LPM  Weight: 115 lbs 11.86 oz    General Appearance: Appears uncomfortable   Respiratory: on BiPAP   Cardiovascular: regular rate   Skin: no visible bruises or lesions   Other: Tremors in extremities      Medical Decision Making       Please see A&P for additional details of medical decision making.      Data     I have personally reviewed the following data over the past 24 hrs:    12.6 (H)  \   12.9 (L)   / 298     141 105 70.8 (H) /  215 (H)   5.7 (H) 19 (L) 4.07 (H) \       ALT: 18 AST: 29 AP: 112 TBILI: 1.0   ALB: 3.9 TOT PROTEIN: 7.0 LIPASE: N/A       Trop: 53 (H) BNP: N/A       Procal: 1.86 (H) CRP: N/A Lactic Acid: 4.7 (HH)         Imaging results reviewed over the past 24 hrs:   Recent Results (from the past 24 hour(s))   XR Chest Port 1 View    Narrative    EXAM: XR CHEST PORT 1 VIEW  3/9/2023 7:24 AM      HISTORY: dyspnea    COMPARISON: None.    FINDINGS: Portable semiupright AP radiograph of the chest. The trachea  is midline. The cardiac silhouette is not enlarged. Atherosclerotic  calcification of the aortic arch. No pleural effusion or pneumothorax.  Mild diffuse bilateral interstitial opacities. The upper abdomen is  unremarkable.       Impression    IMPRESSION: Mild diffuse bilateral interstitial opacities, likely  pulmonary edema versus atypical infection.    I have personally reviewed the examination and initial interpretation  and I  agree with the findings.    LEO NORMAN MD         SYSTEM ID:  [id number]   CT Abdomen Pelvis w/o Contrast    Narrative    EXAMINATION: CT ABDOMEN PELVIS W/O CONTRAST, 3/9/2023 9:28 AM    TECHNIQUE:  Helical CT images from the lung bases through the  symphysis pubis were obtained without contrast.  Coronal reformatted  images were generated at a workstation for further assessment.    COMPARISON: None.    HISTORY: hematuria, ARF    FINDINGS:    Devices: None.    Abdomen and pelvis:   Liver: No suspicious liver lesions. No intrahepatic biliary dilation.    Gallbladder: Hyperdense material within the gallbladder lumen, likely  due to vicarious excretion of contrast. No wall thickening to suggest  cholecystitis..    Spleen: Normal size.    Pancreas: No suspicious pancreatic lesions. The pancreatic duct is not  dilated.    Adrenal glands: Thickened appearance of the left adrenal gland without  focal nodularity.    Kidneys: No kidney masses. No hydronephrosis or obstructing renal  stones.    Bladder: Unremarkable.     Pelvic organs: Prostatomegaly.    Gastrointestinal tract: Mild wall thickening of the rectum. Several  fluid and air distended loops of small bowel in the left hemiabdomen  measuring up to 3.2 cm. The appendix is nonvisualized.    Lymph nodes: Several enlarged mesenteric lymph nodes. For example, 1.7  x 1.2 cm node in the right lower quadrant (series 2, image 103)..    Peritoneum/mesentery: No free fluid. No free air..    Vessels: Severe atherosclerotic disease.    Heart and lung bases: Multifocal nodular and groundglass opacities  throughout the visualized lung bases. No pleural effusion or  pneumothorax. Anemic blood pool.    Bones and soft tissues: No acute or suspicious osseous lesions. Severe  degenerative changes of the lumbar spine, notably at L4-5 with  complete loss of the intervertebral disc space. No suspicious soft  tissue findings.       Impression    IMPRESSION:   1. No radiographic  findings to explain hematuria and acute renal  failure.  2. Multifocal nodular and groundglass opacities at the visualized lung  bases, which is suspicious for infection including atypical  etiologies.  3. Mildly distended small bowel, which can be seen in adynamic ileus  versus early developing obstruction. Several enlarged mesenteric lymph  nodes are favored to be reactive.  4. Large volume stool in the rectum with mild wall thickening of the  rectum suggestive of constipation and possible stercoral colitis.  5. Prostatomegaly.    I have personally reviewed the examination and initial interpretation  and I agree with the findings.    ADELE HOLLOWAY MD         SYSTEM ID:  [id number]   US Lower Extremity Venous Duplex Bilateral    Narrative    EXAMINATION: DOPPLER VENOUS ULTRASOUND OF BILATERAL LOWER EXTREMITIES,  3/9/2023 9:48 AM     COMPARISON: None.    HISTORY: Edema    TECHNIQUE:  Gray-scale evaluation with compression, spectral flow and  color Doppler assessment of the deep venous system of both legs from  groin to knee, and then at the ankles.    FINDINGS:  Right: the common femoral, femoral, popliteal and posterior tibial  veins demonstrate normal compressibility and blood flow.    Left: the common femoral, femoral, popliteal and posterior tibial  veins demonstrate normal compressibility and blood flow.      Impression    IMPRESSION:  1.  No evidence of deep venous thrombosis in either lower extremity.    ADELE HOLLOWAY MD         SYSTEM ID:  [id number]

## 2023-03-09 NOTE — PROGRESS NOTES
Transfer  Transferred from: ED15  Via: bed  Reason for transfer: Inpatient hospice from ED  Family: Aware of transfer/wih pt  Chart: Received with pt  Medications: Meds received from old unit with pt  Code Status verified on armband: yes     Pt status:  Unresponsive. Groans/jolts to stimuli/pain. Pt on comfort cares. Pt Has PRN dilaudid and ativan as needed, refused for now, family will notify if pain meds are needed. Repositioning every 2 hours. Family at bedside/staying overnight, daughter is an ICU nurse.  1700: BIPAP at 50%.   1830: Removed BiPAP per family request. On RA, Oxy-mask and PRN meds as needed.  1900: Pt is comfortable and family does not need anything. Family refused turns for 1900, request for turn again at 9.

## 2023-03-09 NOTE — ED PROVIDER NOTES
Sign out Provider: Maikol   Sign out Plan: 72-year-old male with a history of Parkinson's disease, diabetes, dementia who presents to the emergency department with hypoxia and altered mental status.  Currently is on BiPAP.  He has received vancomycin and Zosyn for concerns of aspiration pneumonia.  Family confirms patient is DNR/DNI.  We will plan to admit to medicine for further respiratory support and care planning.    Reassessment: Chest x-ray shows bilateral infiltrates suggestive of possible atypical infection versus edema.  Azithromycin was added for coverage.  He has a leukocytosis to 12.6 with a lactate of 2.2 and procalcitonin of 1.86.  Additionally his labs are notable for acute renal failure with a creatinine of 4.07, potassium of 5.7, bicarb of 19 and anion gap of 20.  On chart review his prior creatinine in February was 0.74.  His ECG does not demonstrate changes of hyperkalemia.  He was given calcium and shifted with insulin and glucose.  He is receiving IV fluids as well.  We will plan to admit to [facility] for respiratory support, antibiotics, management of electrolyte and renal dysfunction and care planning.    Disposition: Admit           Angeles Paiz MD  03/09/23 4705

## 2023-03-09 NOTE — ED TRIAGE NOTES
biba from tcu  Hx of parkinsons  Abnormal lung sounds  Found slouched. 74% on 2L  93% on 12L non rebreather  18G left hand    DNR - [facility]        
complains of pain/discomfort

## 2023-03-09 NOTE — PHARMACY-VANCOMYCIN DOSING SERVICE
"Pharmacy Vancomycin Initial Note  Date of Service 2023  Patient's  1950  72 year old, male    Indication: Aspiration Pneumonia    Current estimated CrCl = Estimated Creatinine Clearance: 12.5 mL/min (A) (based on SCr of 4.07 mg/dL (H)).    Creatinine for last 3 days  3/9/2023:  6:55 AM Creatinine 4.07 mg/dL    Recent Vancomycin Level(s) for last 3 days  No results found for requested labs within last 72 hours.      Vancomycin IV Administrations (past 72 hours)                   vancomycin (VANCOCIN) 1,250 mg in 0.9% NaCl 250 mL intermittent infusion (mg) 1,250 mg New Bag 23                Nephrotoxins and other renal medications (From now, onward)    Start     Dose/Rate Route Frequency Ordered Stop    23  vancomycin place calle - receiving intermittent dosing         1 each Intravenous SEE ADMIN INSTRUCTIONS 23  piperacillin-tazobactam (ZOSYN) 3.375 g vial to attach to  mL bag        Note to Pharmacy: For [facility], [facility] and [facility]: For Zosyn-naive patients, use the \"Zosyn initial dose + extended infusion\" order panel.    3.375 g  over 30 Minutes Intravenous EVERY 6 HOURS 23 0711            Contrast Orders - past 72 hours (72h ago, onward)    None              Plan:  1. Start vancomycin load 1250 mg IV once. Due to FAIZAN, recommend intermittent dosing.   2. Vancomycin monitoring method: Trough (Method 2 = manual dose calculation)  3. Vancomycin therapeutic monitoring goal: 10-15 mg/L  4. Pharmacy will check vancomycin levels as appropriate in 1-3 Days.    5. Serum creatinine levels will be ordered daily for the first week of therapy and at least twice weekly for subsequent weeks.      Sheyla Paulson, PharmD - PGY1 pharmacy resident   "

## 2023-03-09 NOTE — DISCHARGE SUMMARY
[facility]  Hospitalist Discharge Summary      Date of Admission:  3/9/2023  Date of Discharge:  3/9/2023  Discharging Provider: Merle Garcia MD    Discharge Diagnoses   Acute hypoxic respiratory failure   Lactic acidosis  Hyperkalemia   Agitation  Muscle spasms   Progressive supranuclear palsy     Follow-ups Needed After Discharge     Vincent Escobar is being readmitted to inpatient hospice    Discharge Disposition   Discharged to inpatient hospice  Condition at discharge: Guarded    Hospital Course   Vincent Escobar is a 72 year old male who was admitted on 3/9/2023.  He  is being discharged from the current hospital encounter and will be readmitted to inpatient hospice. Vincent was admitted due to acute hypoxic respiratory failure. He was set to enroll in hospice this upcoming Tuesday. Given his acute decline, his wife opted to enroll him in hospice on this admission and not pursue other interventions.          Consultations This Hospital Stay   PHARMACY TO DOSE VANCO  [facility] INPATIENT HOSPICE ADULT CONSULT  [facility] INPATIENT HOSPICE ADULT CONSULT  PHARMACY IP CONSULT    Code Status   No CPR- Do NOT Intubate    Time Spent on this Encounter   I, Merle Garcia MD, personally saw the patient today and spent greater than 30 minutes discharging this patient.       Merle Garcia MD  [facility] EMERGENCY DEPARTMENT  [address] [facility] [facility] [address]  Phone: [phone]  ______________________________________________________________________    Physical Exam   Vital Signs: Temp: 97.9  F (36.6  C) Temp src: Axillary BP: 127/65 Pulse: 68   Resp: 21 SpO2: 96 % O2 Device: Non-rebreather mask Oxygen Delivery: 12 LPM  Weight: 115 lbs 11.86 oz    Please see physical exam from my H&P on the same date       Primary Care Physician   TIANNA SYLVESTER    Discharge Orders   No discharge procedures on file.    Significant Results and Procedures   Results for orders placed or  performed during the hospital encounter of 03/09/23   XR Chest Port 1 View    Narrative    EXAM: XR CHEST PORT 1 VIEW  3/9/2023 7:24 AM      HISTORY: dyspnea    COMPARISON: None.    FINDINGS: Portable semiupright AP radiograph of the chest. The trachea  is midline. The cardiac silhouette is not enlarged. Atherosclerotic  calcification of the aortic arch. No pleural effusion or pneumothorax.  Mild diffuse bilateral interstitial opacities. The upper abdomen is  unremarkable.       Impression    IMPRESSION: Mild diffuse bilateral interstitial opacities, likely  pulmonary edema versus atypical infection.    I have personally reviewed the examination and initial interpretation  and I agree with the findings.    LEO NORMAN MD         SYSTEM ID:  [id number]   US Lower Extremity Venous Duplex Bilateral    Narrative    EXAMINATION: DOPPLER VENOUS ULTRASOUND OF BILATERAL LOWER EXTREMITIES,  3/9/2023 9:48 AM     COMPARISON: None.    HISTORY: Edema    TECHNIQUE:  Gray-scale evaluation with compression, spectral flow and  color Doppler assessment of the deep venous system of both legs from  groin to knee, and then at the ankles.    FINDINGS:  Right: the common femoral, femoral, popliteal and posterior tibial  veins demonstrate normal compressibility and blood flow.    Left: the common femoral, femoral, popliteal and posterior tibial  veins demonstrate normal compressibility and blood flow.      Impression    IMPRESSION:  1.  No evidence of deep venous thrombosis in either lower extremity.    ADELE HOLLOWAY MD         SYSTEM ID:  [id number]   CT Abdomen Pelvis w/o Contrast    Narrative    EXAMINATION: CT ABDOMEN PELVIS W/O CONTRAST, 3/9/2023 9:28 AM    TECHNIQUE:  Helical CT images from the lung bases through the  symphysis pubis were obtained without contrast.  Coronal reformatted  images were generated at a workstation for further assessment.    COMPARISON: None.    HISTORY: hematuria, ARF    FINDINGS:    Devices:  None.    Abdomen and pelvis:   Liver: No suspicious liver lesions. No intrahepatic biliary dilation.    Gallbladder: Hyperdense material within the gallbladder lumen, likely  due to vicarious excretion of contrast. No wall thickening to suggest  cholecystitis..    Spleen: Normal size.    Pancreas: No suspicious pancreatic lesions. The pancreatic duct is not  dilated.    Adrenal glands: Thickened appearance of the left adrenal gland without  focal nodularity.    Kidneys: No kidney masses. No hydronephrosis or obstructing renal  stones.    Bladder: Unremarkable.     Pelvic organs: Prostatomegaly.    Gastrointestinal tract: Mild wall thickening of the rectum. Several  fluid and air distended loops of small bowel in the left hemiabdomen  measuring up to 3.2 cm. The appendix is nonvisualized.    Lymph nodes: Several enlarged mesenteric lymph nodes. For example, 1.7  x 1.2 cm node in the right lower quadrant (series 2, image 103)..    Peritoneum/mesentery: No free fluid. No free air..    Vessels: Severe atherosclerotic disease.    Heart and lung bases: Multifocal nodular and groundglass opacities  throughout the visualized lung bases. No pleural effusion or  pneumothorax. Anemic blood pool.    Bones and soft tissues: No acute or suspicious osseous lesions. Severe  degenerative changes of the lumbar spine, notably at L4-5 with  complete loss of the intervertebral disc space. No suspicious soft  tissue findings.       Impression    IMPRESSION:   1. No radiographic findings to explain hematuria and acute renal  failure.  2. Multifocal nodular and groundglass opacities at the visualized lung  bases, which is suspicious for infection including atypical  etiologies.  3. Mildly distended small bowel, which can be seen in adynamic ileus  versus early developing obstruction. Several enlarged mesenteric lymph  nodes are favored to be reactive.  4. Large volume stool in the rectum with mild wall thickening of the  rectum suggestive  of constipation and possible stercoral colitis.  5. Prostatomegaly.    I have personally reviewed the examination and initial interpretation  and I agree with the findings.    ADELE HOLLOWAY MD         SYSTEM ID:  [id number]       Discharge Medications     Current Facility-Administered Medications:      acetaminophen (TYLENOL) tablet 650 mg, 650 mg, Oral, Q6H PRN **OR** acetaminophen (TYLENOL) Suppository 650 mg, 650 mg, Rectal, Q6H PRN, Merle Garcia MD     acetaminophen (TYLENOL) Suppository 650 mg, 650 mg, Rectal, Q6H PRN, Keyonna Raya MD     albuterol (PROVENTIL) neb solution 2.5 mg, 2.5 mg, Nebulization, Q2H PRN, Angeles Paiz MD     artificial saliva (BIOTENE MT) solution 1 spray, 1 spray, Mouth/Throat, Q1H PRN, Keyonna Raya MD     carboxymethylcellulose PF (REFRESH PLUS) 0.5 % ophthalmic solution 1 drop, 1 drop, Both Eyes, Q1H PRN, Merle Garcia MD     carboxymethylcellulose PF (REFRESH PLUS) 0.5 % ophthalmic solution 1-2 drop, 1-2 drop, Both Eyes, Q1H PRN, Keyonna Raya MD     glucose gel 15-30 g, 15-30 g, Oral, Q15 Min PRN **OR** dextrose 50 % injection 25-50 mL, 25-50 mL, Intravenous, Q15 Min PRN **OR** glucagon injection 1 mg, 1 mg, Subcutaneous, Q15 Min PRN, Angeles Paiz MD     glycopyrrolate (ROBINUL) injection 0.2 mg, 0.2 mg, Intravenous, Q4H PRN, Keyonna Raya MD     glycopyrrolate (ROBINUL) tablet 2 mg, 2 mg, Oral, Q4H PRN, Keyonna Raya MD     haloperidol lactate (HALDOL) injection 1 mg, 1 mg, Intravenous, Q1H PRN, Keyonna Raya MD     HYDROmorphone (STANDARD CONC) (DILAUDID) oral solution 1 mg, 1 mg, Oral, Q2H PRN **OR** HYDROmorphone (DILAUDID) half-tab 1 mg, 1 mg, Oral, Q2H PRN, Keyonna Raya MD     HYDROmorphone (STANDARD CONC) (DILAUDID) oral solution 2 mg, 2 mg, Oral, Q2H PRN **OR** HYDROmorphone (DILAUDID) tablet 2 mg, 2 mg, Oral, Q2H PRN, Keyonna Raya MD     HYDROmorphone (PF) (DILAUDID) injection 0.3 mg, 0.3 mg, Intravenous, Q15 Min PRN, Keyonna Raya MD      HYDROmorphone (PF) (DILAUDID) injection 0.5 mg, 0.5 mg, Intravenous, Q15 Min PRN, Keyonna Raya MD     ipratropium - albuterol 0.5 mg/2.5 mg/3 mL (DUONEB) neb solution 3 mL, 3 mL, Nebulization, Once, [place], Angeles Lowry MD     lidocaine (LMX4) cream, , Topical, Q1H PRN, Merle Garcia MD     lidocaine 1 % 0.1-1 mL, 0.1-1 mL, Other, Q1H PRN, Merle Garcia MD     LORazepam (ATIVAN) injection 0.5 mg, 0.5 mg, Intravenous, Q3H PRN, 0.5 mg at 03/09/23 1142 **OR** LORazepam (ATIVAN) tablet 1 mg, 1 mg, Sublingual, Q3H PRN, Merle Garcia MD     melatonin tablet 1 mg, 1 mg, Oral, At Bedtime PRN, Merle Garcia MD     mineral oil-hydrophilic petrolatum (AQUAPHOR), , Topical, Q1H PRN, Keyonna Raya MD     naloxone (NARCAN) injection 0.1 mg, 0.1 mg, Intravenous, Q2 Min PRN, Keyonna Raya MD     naloxone (NARCAN) injection 0.2 mg, 0.2 mg, Intravenous, Q2 Min PRN, Keyonna Raya MD     No lozenges or gum should be given while patient on BIPAP/AVAPS/AVAPS AE, , Does not apply, Continuous PRN, Merle Garcia MD     OLANZapine zydis (zyPREXA) ODT tab 5 mg, 5 mg, Oral, Q6H PRN, Merle Garcia MD     Patient may continue current oral medications, , Does not apply, Continuous PRN, Merle Garcia MD     polyethylene glycol (MIRALAX) Packet 17 g, 17 g, Oral, Daily PRN, Merle Garcia MD     senna-docusate (SENOKOT-S/PERICOLACE) 8.6-50 MG per tablet 1 tablet, 1 tablet, Oral, BID PRN **OR** senna-docusate (SENOKOT-S/PERICOLACE) 8.6-50 MG per tablet 2 tablet, 2 tablet, Oral, BID PRN, Merle Garcia MD     sodium chloride (PF) 0.9% PF flush 3 mL, 3 mL, Intracatheter, q1 min prn, Merle Garcia MD  No current outpatient medications on file.    Allergies   Not on File

## 2023-03-09 NOTE — ED PROVIDER NOTES
"ED Provider Note  [facility]      History     Chief Complaint   Patient presents with     Shortness of Breath     HPI  Vincent Escobar is a 72 year old male who presents to us with a chief complaint of shortness of breath.  Patient was admitted to a TCU after recent stay and [facility].  Patient has a history of Parkinson's, diabetes, Alzheimer's, recurrent issues with aspiration pneumonia as well that were addressed.  He was also found to have a Staph aureus UTI.  He does have open ulcers on his heels that are intermittently infected as well.  He was found by staff overnight slumped over in his chair with a pulse oxygenation of 70%.  He was on 2 L at that time.  EMS was called and he was placed on higher concentration on his surgeon and transported here.  I did discuss his care with his wife over the phone.  She does confirm he is a currently DNR/DNI.  I have a plan to likely place him on hospice within the next week.  No reported fevers.  No reported chest pain.      Past Medical History  Weakness  Active Problems:  Parkinson disease (HC)  Diabetic polyneuropathy (HC)  Late onset Alzheimer's dementia with behavioral disturbance (HC)  Moderate depressed bipolar I disorder (HC)  Aspiration pneumonia (HC)  PSP (progressive supranuclear palsy) (HC)        Not on File  Family History  No family history on file.  Social History          A medically appropriate review of systems was performed with pertinent positives and negatives noted in the HPI, and all other systems negative.    Physical Exam   BP: 132/54  Pulse: 71  Temp: 97.9  F (36.6  C)  Resp: 22  Height: 175.3 cm (5' 9.02\")  Weight: 52.5 kg (115 lb 11.9 oz)  SpO2: 95 %  Physical Exam  Vitals reviewed.   Constitutional:       General: He is in acute distress.      Appearance: He is ill-appearing. He is not diaphoretic.   HENT:      Head: Normocephalic and atraumatic.      Right Ear: External ear normal.      Left Ear: External " ear normal.      Nose: Nose normal. No rhinorrhea.      Mouth/Throat:      Mouth: Mucous membranes are moist.   Eyes:      General: No scleral icterus.     Extraocular Movements: Extraocular movements intact.      Conjunctiva/sclera: Conjunctivae normal.   Cardiovascular:      Rate and Rhythm: Normal rate and regular rhythm.      Heart sounds: Normal heart sounds.   Pulmonary:      Effort: Respiratory distress present.      Comments: Coarse breath sounds with diffuse crackles  Abdominal:      General: Bowel sounds are normal.      Palpations: Abdomen is soft.      Tenderness: There is no abdominal tenderness.   Musculoskeletal:         General: No deformity. Normal range of motion.      Cervical back: Normal range of motion and neck supple.   Skin:     General: Skin is warm.      Coloration: Skin is pale.      Findings: No rash.   Neurological:      Comments: Somnolent, will answer some questions and then go back to sleep   Psychiatric:         Behavior: Behavior normal.           ED Course, Procedures, & Data      Procedures       Results for orders placed or performed during the hospital encounter of 03/09/23   XR Chest Port 1 View     Status: None    Narrative    EXAM: XR CHEST PORT 1 VIEW  3/9/2023 7:24 AM      HISTORY: dyspnea    COMPARISON: None.    FINDINGS: Portable semiupright AP radiograph of the chest. The trachea  is midline. The cardiac silhouette is not enlarged. Atherosclerotic  calcification of the aortic arch. No pleural effusion or pneumothorax.  Mild diffuse bilateral interstitial opacities. The upper abdomen is  unremarkable.       Impression    IMPRESSION: Mild diffuse bilateral interstitial opacities, likely  pulmonary edema versus atypical infection.    I have personally reviewed the examination and initial interpretation  and I agree with the findings.    LEO NORMAN MD         SYSTEM ID:  [id number]    Lower Extremity Venous Duplex Bilateral     Status: None    Narrative     EXAMINATION: DOPPLER VENOUS ULTRASOUND OF BILATERAL LOWER EXTREMITIES,  3/9/2023 9:48 AM     COMPARISON: None.    HISTORY: Edema    TECHNIQUE:  Gray-scale evaluation with compression, spectral flow and  color Doppler assessment of the deep venous system of both legs from  groin to knee, and then at the ankles.    FINDINGS:  Right: the common femoral, femoral, popliteal and posterior tibial  veins demonstrate normal compressibility and blood flow.    Left: the common femoral, femoral, popliteal and posterior tibial  veins demonstrate normal compressibility and blood flow.      Impression    IMPRESSION:  1.  No evidence of deep venous thrombosis in either lower extremity.    ADELE HOLLOWAY MD         SYSTEM ID:  [id number]   CT Abdomen Pelvis w/o Contrast     Status: None    Narrative    EXAMINATION: CT ABDOMEN PELVIS W/O CONTRAST, 3/9/2023 9:28 AM    TECHNIQUE:  Helical CT images from the lung bases through the  symphysis pubis were obtained without contrast.  Coronal reformatted  images were generated at a workstation for further assessment.    COMPARISON: None.    HISTORY: hematuria, ARF    FINDINGS:    Devices: None.    Abdomen and pelvis:   Liver: No suspicious liver lesions. No intrahepatic biliary dilation.    Gallbladder: Hyperdense material within the gallbladder lumen, likely  due to vicarious excretion of contrast. No wall thickening to suggest  cholecystitis..    Spleen: Normal size.    Pancreas: No suspicious pancreatic lesions. The pancreatic duct is not  dilated.    Adrenal glands: Thickened appearance of the left adrenal gland without  focal nodularity.    Kidneys: No kidney masses. No hydronephrosis or obstructing renal  stones.    Bladder: Unremarkable.     Pelvic organs: Prostatomegaly.    Gastrointestinal tract: Mild wall thickening of the rectum. Several  fluid and air distended loops of small bowel in the left hemiabdomen  measuring up to 3.2 cm. The appendix is nonvisualized.    Lymph nodes:  Several enlarged mesenteric lymph nodes. For example, 1.7  x 1.2 cm node in the right lower quadrant (series 2, image 103)..    Peritoneum/mesentery: No free fluid. No free air..    Vessels: Severe atherosclerotic disease.    Heart and lung bases: Multifocal nodular and groundglass opacities  throughout the visualized lung bases. No pleural effusion or  pneumothorax. Anemic blood pool.    Bones and soft tissues: No acute or suspicious osseous lesions. Severe  degenerative changes of the lumbar spine, notably at L4-5 with  complete loss of the intervertebral disc space. No suspicious soft  tissue findings.       Impression    IMPRESSION:   1. No radiographic findings to explain hematuria and acute renal  failure.  2. Multifocal nodular and groundglass opacities at the visualized lung  bases, which is suspicious for infection including atypical  etiologies.  3. Mildly distended small bowel, which can be seen in adynamic ileus  versus early developing obstruction. Several enlarged mesenteric lymph  nodes are favored to be reactive.  4. Large volume stool in the rectum with mild wall thickening of the  rectum suggestive of constipation and possible stercoral colitis.  5. Prostatomegaly.    I have personally reviewed the examination and initial interpretation  and I agree with the findings.    ADELE HOLLOWAY MD         SYSTEM ID:  [id number]   Comprehensive metabolic panel     Status: Abnormal   Result Value Ref Range    Sodium 141 136 - 145 mmol/L    Potassium 5.7 (H) 3.4 - 5.3 mmol/L    Chloride 105 98 - 107 mmol/L    Carbon Dioxide (CO2) 19 (L) 22 - 29 mmol/L    Anion Gap 17 (H) 7 - 15 mmol/L    Urea Nitrogen 70.8 (H) 8.0 - 23.0 mg/dL    Creatinine 4.07 (H) 0.67 - 1.17 mg/dL    Calcium 9.5 8.8 - 10.2 mg/dL    Glucose 200 (H) 70 - 99 mg/dL    Alkaline Phosphatase 112 40 - 129 U/L    AST 29 10 - 50 U/L    ALT 18 10 - 50 U/L    Protein Total 7.0 6.4 - 8.3 g/dL    Albumin 3.9 3.5 - 5.2 g/dL    Bilirubin Total 1.0 <=1.2  mg/dL    GFR Estimate 15 (L) >60 mL/min/1.73m2   Troponin T, High Sensitivity     Status: Abnormal   Result Value Ref Range    Troponin T, High Sensitivity 53 (H) <=22 ng/L   UA with Microscopic reflex to Culture     Status: Abnormal    Specimen: Urine, Catheter   Result Value Ref Range    Color Urine Yellow Colorless, Straw, Light Yellow, Yellow    Appearance Urine Slightly Cloudy (A) Clear    Glucose Urine 70 (A) Negative mg/dL    Bilirubin Urine Negative Negative    Ketones Urine Negative Negative mg/dL    Specific Gravity Urine 1.024 1.003 - 1.035    Blood Urine Large (A) Negative    pH Urine 5.5 5.0 - 7.0    Protein Albumin Urine 70 (A) Negative mg/dL    Urobilinogen Urine Normal Normal, 2.0 mg/dL    Nitrite Urine Negative Negative    Leukocyte Esterase Urine Negative Negative    Mucus Urine Present (A) None Seen /LPF    RBC Urine >182 (H) <=2 /HPF    WBC Urine 1 <=5 /HPF    Hyaline Casts Urine 21 (H) <=2 /LPF    Narrative    Urine Culture not indicated   Lactic acid whole blood     Status: Abnormal   Result Value Ref Range    Lactic Acid 2.4 (H) 0.7 - 2.0 mmol/L   Procalcitonin     Status: Abnormal   Result Value Ref Range    Procalcitonin 1.86 (H) <0.05 ng/mL   Symptomatic Influenza A/B, RSV, & SARS-CoV2 PCR (COVID-19) Nasopharyngeal     Status: Normal    Specimen: Nasopharyngeal; Swab   Result Value Ref Range    Influenza A PCR Negative Negative    Influenza B PCR Negative Negative    RSV PCR Negative Negative    SARS CoV2 PCR Negative Negative    Narrative    Testing was performed using the Xpert Xpress CoV2/Flu/RSV Assay on the Cepheid GeneXpert Instrument. This test should be ordered for the detection of SARS-CoV-2, influenza, and RSV viruses in individuals who meet clinical and/or epidemiological criteria. Test performance is unknown in asymptomatic patients. This test is for in vitro diagnostic use under the FDA EUA for laboratories certified under CLIA to perform high or moderate complexity testing.  This test has not been FDA cleared or approved. A negative result does not rule out the presence of PCR inhibitors in the specimen or target RNA in concentration below the limit of detection for the assay. If only one viral target is positive but coinfection with multiple targets is suspected, the sample should be re-tested with another FDA cleared, approved, or authorized test, if coinfection would change clinical management. This test was validated by the [facility] [email or web address]. These laboratories are certified under the Clinical Laboratory Improvement Amendments of 1988 (CLIA-88) as qualified to perform high complexity laboratory testing.   [place] Draw *Canceled*     Status: None ()    Narrative    The following orders were created for panel order [place] Draw.  Procedure                               Abnormality         Status                     ---------                               -----------         ------                       Please view results for these tests on the individual orders.   [place] Draw     Status: None    Narrative    The following orders were created for panel order [place] Draw.  Procedure                               Abnormality         Status                     ---------                               -----------         ------                     Extra Blue Top Tube[[id number]]                              Final result               Extra Red Top Tube[[id number]]                               Final result               Extra Green Top (Lithium...[[id number]]                                                 Extra Purple Top Tube[[id number]]                                                         Please view results for these tests on the individual orders.   CBC with platelets and differential     Status: Abnormal   Result Value Ref Range    WBC Count 12.6 (H) 4.0 - 11.0 10e3/uL    RBC Count 5.05 4.40 - 5.90 10e6/uL    Hemoglobin 12.9 (L) 13.3 - 17.7 g/dL    Hematocrit 40.7 40.0  - 53.0 %    MCV 81 78 - 100 fL    MCH 25.5 (L) 26.5 - 33.0 pg    MCHC 31.7 31.5 - 36.5 g/dL    RDW 21.4 (H) 10.0 - 15.0 %    Platelet Count 298 150 - 450 10e3/uL    % Neutrophils 90 %    % Lymphocytes 6 %    % Monocytes 4 %    % Eosinophils 0 %    % Basophils 0 %    % Immature Granulocytes 0 %    NRBCs per 100 WBC 0 <1 /100    Absolute Neutrophils 11.3 (H) 1.6 - 8.3 10e3/uL    Absolute Lymphocytes 0.7 (L) 0.8 - 5.3 10e3/uL    Absolute Monocytes 0.5 0.0 - 1.3 10e3/uL    Absolute Eosinophils 0.0 0.0 - 0.7 10e3/uL    Absolute Basophils 0.0 0.0 - 0.2 10e3/uL    Absolute Immature Granulocytes 0.0 <=0.4 10e3/uL    Absolute NRBCs 0.0 10e3/uL   Extra Blue Top Tube     Status: None   Result Value Ref Range    Hold Specimen JIC    Extra Red Top Tube     Status: None   Result Value Ref Range    Hold Specimen JIC    iStat Gases Electrolytes ICA Glucose Venous, POCT     Status: Abnormal   Result Value Ref Range    CPB Applied No     Hematocrit POCT 40 40 - 53 %    Calcium, Ionized Whole Blood POCT 5.1 4.4 - 5.2 mg/dL    Glucose Whole Blood POCT 198 (H) 70 - 99 mg/dL    Bicarbonate Venous POCT 21 21 - 28 mmol/L    Hemoglobin POCT 13.6 13.3 - 17.7 g/dL    Potassium POCT 5.6 (H) 3.4 - 5.3 mmol/L    Sodium POCT 144 133 - 144 mmol/L    pCO2 Venous POCT 40 40 - 50 mm Hg    pO2 Venous POCT 57 (H) 25 - 47 mm Hg    pH Venous POCT 7.34 7.32 - 7.43    O2 Sat, Venous POCT 88 (L) 94 - 100 %   iStat Gases (lactate) venous, POCT     Status: Abnormal   Result Value Ref Range    Lactic Acid POCT 2.2 (H) <=2.0 mmol/L    Bicarbonate Venous POCT 21 21 - 28 mmol/L    O2 Sat, Venous POCT 88 (L) 94 - 100 %    pCO2V Venous POCT 40 40 - 50 mm Hg    pH Venous POCT 7.32 7.32 - 7.43    pO2 Venous POCT 59 (H) 25 - 47 mm Hg   Blood gas arterial     Status: Abnormal   Result Value Ref Range    pH Arterial 7.32 (L) 7.35 - 7.45    pCO2 Arterial 44 35 - 45 mm Hg    pO2 Arterial 34 (LL) 80 - 105 mm Hg    FIO2 50     Bicarbonate Arterial 23 21 - 28 mmol/L    Base  Excess/Deficit (+/-) -3.6 -9.0 - 1.8 mmol/L    Roman's Test Yes    Potassium     Status: Abnormal   Result Value Ref Range    Potassium 5.5 (H) 3.4 - 5.3 mmol/L   Lactic acid whole blood     Status: Abnormal   Result Value Ref Range    Lactic Acid 4.7 (HH) 0.7 - 2.0 mmol/L   Glucose by meter     Status: Abnormal   Result Value Ref Range    GLUCOSE BY METER POCT 167 (H) 70 - 99 mg/dL   Glucose by meter     Status: Abnormal   Result Value Ref Range    GLUCOSE BY METER POCT 238 (H) 70 - 99 mg/dL   Glucose by meter     Status: Abnormal   Result Value Ref Range    GLUCOSE BY METER POCT 224 (H) 70 - 99 mg/dL   Glucose by meter     Status: Abnormal   Result Value Ref Range    GLUCOSE BY METER POCT 175 (H) 70 - 99 mg/dL   Glucose by meter     Status: Abnormal   Result Value Ref Range    GLUCOSE BY METER POCT 215 (H) 70 - 99 mg/dL   EKG 12-lead, tracing only     Status: None   Result Value Ref Range    Systolic Blood Pressure  mmHg    Diastolic Blood Pressure  mmHg    Ventricular Rate 70 BPM    Atrial Rate 70 BPM    NM Interval 170 ms    QRS Duration 116 ms     ms    QTc 466 ms    P Axis 90 degrees    R AXIS -46 degrees    T Axis 78 degrees    Interpretation ECG       Sinus rhythm  Left axis deviation  Pulmonary disease pattern  Possible Inferior infarct , age undetermined  Abnormal ECG     Blood Culture Peripheral Blood     Status: Normal (Preliminary result)    Specimen: Peripheral Blood   Result Value Ref Range    Culture No growth after 12 hours    Blood Culture Peripheral Blood     Status: Normal (Preliminary result)    Specimen: Peripheral Blood   Result Value Ref Range    Culture No growth after 12 hours    CBC with platelets differential     Status: Abnormal    Narrative    The following orders were created for panel order CBC with platelets differential.  Procedure                               Abnormality         Status                     ---------                               -----------         ------                      CBC with platelets and d...[[id number]]  Abnormal            Final result                 Please view results for these tests on the individual orders.              Results for orders placed or performed during the hospital encounter of 03/09/23   XR Chest Port 1 View     Status: None    Narrative    EXAM: XR CHEST PORT 1 VIEW  3/9/2023 7:24 AM      HISTORY: dyspnea    COMPARISON: None.    FINDINGS: Portable semiupright AP radiograph of the chest. The trachea  is midline. The cardiac silhouette is not enlarged. Atherosclerotic  calcification of the aortic arch. No pleural effusion or pneumothorax.  Mild diffuse bilateral interstitial opacities. The upper abdomen is  unremarkable.       Impression    IMPRESSION: Mild diffuse bilateral interstitial opacities, likely  pulmonary edema versus atypical infection.    I have personally reviewed the examination and initial interpretation  and I agree with the findings.    LEO NORMAN MD         SYSTEM ID:  [id number]   US Lower Extremity Venous Duplex Bilateral     Status: None    Narrative    EXAMINATION: DOPPLER VENOUS ULTRASOUND OF BILATERAL LOWER EXTREMITIES,  3/9/2023 9:48 AM     COMPARISON: None.    HISTORY: Edema    TECHNIQUE:  Gray-scale evaluation with compression, spectral flow and  color Doppler assessment of the deep venous system of both legs from  groin to knee, and then at the ankles.    FINDINGS:  Right: the common femoral, femoral, popliteal and posterior tibial  veins demonstrate normal compressibility and blood flow.    Left: the common femoral, femoral, popliteal and posterior tibial  veins demonstrate normal compressibility and blood flow.      Impression    IMPRESSION:  1.  No evidence of deep venous thrombosis in either lower extremity.    ADELE HOLLOWAY MD         SYSTEM ID:  [id number]   CT Abdomen Pelvis w/o Contrast     Status: None    Narrative    EXAMINATION: CT ABDOMEN PELVIS W/O CONTRAST, 3/9/2023 9:28 AM    TECHNIQUE:   Helical CT images from the lung bases through the  symphysis pubis were obtained without contrast.  Coronal reformatted  images were generated at a workstation for further assessment.    COMPARISON: None.    HISTORY: hematuria, ARF    FINDINGS:    Devices: None.    Abdomen and pelvis:   Liver: No suspicious liver lesions. No intrahepatic biliary dilation.    Gallbladder: Hyperdense material within the gallbladder lumen, likely  due to vicarious excretion of contrast. No wall thickening to suggest  cholecystitis..    Spleen: Normal size.    Pancreas: No suspicious pancreatic lesions. The pancreatic duct is not  dilated.    Adrenal glands: Thickened appearance of the left adrenal gland without  focal nodularity.    Kidneys: No kidney masses. No hydronephrosis or obstructing renal  stones.    Bladder: Unremarkable.     Pelvic organs: Prostatomegaly.    Gastrointestinal tract: Mild wall thickening of the rectum. Several  fluid and air distended loops of small bowel in the left hemiabdomen  measuring up to 3.2 cm. The appendix is nonvisualized.    Lymph nodes: Several enlarged mesenteric lymph nodes. For example, 1.7  x 1.2 cm node in the right lower quadrant (series 2, image 103)..    Peritoneum/mesentery: No free fluid. No free air..    Vessels: Severe atherosclerotic disease.    Heart and lung bases: Multifocal nodular and groundglass opacities  throughout the visualized lung bases. No pleural effusion or  pneumothorax. Anemic blood pool.    Bones and soft tissues: No acute or suspicious osseous lesions. Severe  degenerative changes of the lumbar spine, notably at L4-5 with  complete loss of the intervertebral disc space. No suspicious soft  tissue findings.       Impression    IMPRESSION:   1. No radiographic findings to explain hematuria and acute renal  failure.  2. Multifocal nodular and groundglass opacities at the visualized lung  bases, which is suspicious for infection including atypical  etiologies.  3.  Mildly distended small bowel, which can be seen in adynamic ileus  versus early developing obstruction. Several enlarged mesenteric lymph  nodes are favored to be reactive.  4. Large volume stool in the rectum with mild wall thickening of the  rectum suggestive of constipation and possible stercoral colitis.  5. Prostatomegaly.    I have personally reviewed the examination and initial interpretation  and I agree with the findings.    ADELE HOLLOWAY MD         SYSTEM ID:  [id number]   Comprehensive metabolic panel     Status: Abnormal   Result Value Ref Range    Sodium 141 136 - 145 mmol/L    Potassium 5.7 (H) 3.4 - 5.3 mmol/L    Chloride 105 98 - 107 mmol/L    Carbon Dioxide (CO2) 19 (L) 22 - 29 mmol/L    Anion Gap 17 (H) 7 - 15 mmol/L    Urea Nitrogen 70.8 (H) 8.0 - 23.0 mg/dL    Creatinine 4.07 (H) 0.67 - 1.17 mg/dL    Calcium 9.5 8.8 - 10.2 mg/dL    Glucose 200 (H) 70 - 99 mg/dL    Alkaline Phosphatase 112 40 - 129 U/L    AST 29 10 - 50 U/L    ALT 18 10 - 50 U/L    Protein Total 7.0 6.4 - 8.3 g/dL    Albumin 3.9 3.5 - 5.2 g/dL    Bilirubin Total 1.0 <=1.2 mg/dL    GFR Estimate 15 (L) >60 mL/min/1.73m2   Troponin T, High Sensitivity     Status: Abnormal   Result Value Ref Range    Troponin T, High Sensitivity 53 (H) <=22 ng/L   UA with Microscopic reflex to Culture     Status: Abnormal    Specimen: Urine, Catheter   Result Value Ref Range    Color Urine Yellow Colorless, Straw, Light Yellow, Yellow    Appearance Urine Slightly Cloudy (A) Clear    Glucose Urine 70 (A) Negative mg/dL    Bilirubin Urine Negative Negative    Ketones Urine Negative Negative mg/dL    Specific Gravity Urine 1.024 1.003 - 1.035    Blood Urine Large (A) Negative    pH Urine 5.5 5.0 - 7.0    Protein Albumin Urine 70 (A) Negative mg/dL    Urobilinogen Urine Normal Normal, 2.0 mg/dL    Nitrite Urine Negative Negative    Leukocyte Esterase Urine Negative Negative    Mucus Urine Present (A) None Seen /LPF    RBC Urine >182 (H) <=2 /HPF    WBC  Urine 1 <=5 /HPF    Hyaline Casts Urine 21 (H) <=2 /LPF    Narrative    Urine Culture not indicated   Lactic acid whole blood     Status: Abnormal   Result Value Ref Range    Lactic Acid 2.4 (H) 0.7 - 2.0 mmol/L   Procalcitonin     Status: Abnormal   Result Value Ref Range    Procalcitonin 1.86 (H) <0.05 ng/mL   Symptomatic Influenza A/B, RSV, & SARS-CoV2 PCR (COVID-19) Nasopharyngeal     Status: Normal    Specimen: Nasopharyngeal; Swab   Result Value Ref Range    Influenza A PCR Negative Negative    Influenza B PCR Negative Negative    RSV PCR Negative Negative    SARS CoV2 PCR Negative Negative    Narrative    Testing was performed using the Xpert Xpress CoV2/Flu/RSV Assay on the [employer] GeneXpert Instrument. This test should be ordered for the detection of SARS-CoV-2, influenza, and RSV viruses in individuals who meet clinical and/or epidemiological criteria. Test performance is unknown in asymptomatic patients. This test is for in vitro diagnostic use under the FDA EUA for laboratories certified under CLIA to perform high or moderate complexity testing. This test has not been FDA cleared or approved. A negative result does not rule out the presence of PCR inhibitors in the specimen or target RNA in concentration below the limit of detection for the assay. If only one viral target is positive but coinfection with multiple targets is suspected, the sample should be re-tested with another FDA cleared, approved, or authorized test, if coinfection would change clinical management. This test was validated by the [facility]. These laboratories are certified under the Clinical Laboratory Improvement Amendments of 1988 (CLIA-88) as qualified to perform high complexity laboratory testing.   [place] Draw *Canceled*     Status: None ()    Narrative    The following orders were created for panel order [place] Draw.  Procedure                               Abnormality         Status                      ---------                               -----------         ------                       Please view results for these tests on the individual orders.   [place] Draw     Status: None    Narrative    The following orders were created for panel order [place] Draw.  Procedure                               Abnormality         Status                     ---------                               -----------         ------                     Extra Blue Top Tube[[id number]]                              Final result               Extra Red Top Tube[[id number]]                               Final result               Extra Green Top (Lithium...[[id number]]                                                 Extra Purple Top Tube[[id number]]                                                         Please view results for these tests on the individual orders.   CBC with platelets and differential     Status: Abnormal   Result Value Ref Range    WBC Count 12.6 (H) 4.0 - 11.0 10e3/uL    RBC Count 5.05 4.40 - 5.90 10e6/uL    Hemoglobin 12.9 (L) 13.3 - 17.7 g/dL    Hematocrit 40.7 40.0 - 53.0 %    MCV 81 78 - 100 fL    MCH 25.5 (L) 26.5 - 33.0 pg    MCHC 31.7 31.5 - 36.5 g/dL    RDW 21.4 (H) 10.0 - 15.0 %    Platelet Count 298 150 - 450 10e3/uL    % Neutrophils 90 %    % Lymphocytes 6 %    % Monocytes 4 %    % Eosinophils 0 %    % Basophils 0 %    % Immature Granulocytes 0 %    NRBCs per 100 WBC 0 <1 /100    Absolute Neutrophils 11.3 (H) 1.6 - 8.3 10e3/uL    Absolute Lymphocytes 0.7 (L) 0.8 - 5.3 10e3/uL    Absolute Monocytes 0.5 0.0 - 1.3 10e3/uL    Absolute Eosinophils 0.0 0.0 - 0.7 10e3/uL    Absolute Basophils 0.0 0.0 - 0.2 10e3/uL    Absolute Immature Granulocytes 0.0 <=0.4 10e3/uL    Absolute NRBCs 0.0 10e3/uL   Extra Blue Top Tube     Status: None   Result Value Ref Range    Hold Specimen JIC    Extra Red Top Tube     Status: None   Result Value Ref Range    Hold Specimen JIC    iStat Gases Electrolytes ICA Glucose Venous, POCT      Status: Abnormal   Result Value Ref Range    CPB Applied No     Hematocrit POCT 40 40 - 53 %    Calcium, Ionized Whole Blood POCT 5.1 4.4 - 5.2 mg/dL    Glucose Whole Blood POCT 198 (H) 70 - 99 mg/dL    Bicarbonate Venous POCT 21 21 - 28 mmol/L    Hemoglobin POCT 13.6 13.3 - 17.7 g/dL    Potassium POCT 5.6 (H) 3.4 - 5.3 mmol/L    Sodium POCT 144 133 - 144 mmol/L    pCO2 Venous POCT 40 40 - 50 mm Hg    pO2 Venous POCT 57 (H) 25 - 47 mm Hg    pH Venous POCT 7.34 7.32 - 7.43    O2 Sat, Venous POCT 88 (L) 94 - 100 %   iStat Gases (lactate) venous, POCT     Status: Abnormal   Result Value Ref Range    Lactic Acid POCT 2.2 (H) <=2.0 mmol/L    Bicarbonate Venous POCT 21 21 - 28 mmol/L    O2 Sat, Venous POCT 88 (L) 94 - 100 %    pCO2V Venous POCT 40 40 - 50 mm Hg    pH Venous POCT 7.32 7.32 - 7.43    pO2 Venous POCT 59 (H) 25 - 47 mm Hg   Blood gas arterial     Status: Abnormal   Result Value Ref Range    pH Arterial 7.32 (L) 7.35 - 7.45    pCO2 Arterial 44 35 - 45 mm Hg    pO2 Arterial 34 (LL) 80 - 105 mm Hg    FIO2 50     Bicarbonate Arterial 23 21 - 28 mmol/L    Base Excess/Deficit (+/-) -3.6 -9.0 - 1.8 mmol/L    Roman's Test Yes    Potassium     Status: Abnormal   Result Value Ref Range    Potassium 5.5 (H) 3.4 - 5.3 mmol/L   Lactic acid whole blood     Status: Abnormal   Result Value Ref Range    Lactic Acid 4.7 (HH) 0.7 - 2.0 mmol/L   Glucose by meter     Status: Abnormal   Result Value Ref Range    GLUCOSE BY METER POCT 167 (H) 70 - 99 mg/dL   Glucose by meter     Status: Abnormal   Result Value Ref Range    GLUCOSE BY METER POCT 238 (H) 70 - 99 mg/dL   Glucose by meter     Status: Abnormal   Result Value Ref Range    GLUCOSE BY METER POCT 224 (H) 70 - 99 mg/dL   Glucose by meter     Status: Abnormal   Result Value Ref Range    GLUCOSE BY METER POCT 175 (H) 70 - 99 mg/dL   Glucose by meter     Status: Abnormal   Result Value Ref Range    GLUCOSE BY METER POCT 215 (H) 70 - 99 mg/dL   EKG 12-lead, tracing only      Status: None   Result Value Ref Range    Systolic Blood Pressure  mmHg    Diastolic Blood Pressure  mmHg    Ventricular Rate 70 BPM    Atrial Rate 70 BPM    TX Interval 170 ms    QRS Duration 116 ms     ms    QTc 466 ms    P Axis 90 degrees    R AXIS -46 degrees    T Axis 78 degrees    Interpretation ECG       Sinus rhythm  Left axis deviation  Pulmonary disease pattern  Possible Inferior infarct , age undetermined  Abnormal ECG     Blood Culture Peripheral Blood     Status: Normal (Preliminary result)    Specimen: Peripheral Blood   Result Value Ref Range    Culture No growth after 12 hours    Blood Culture Peripheral Blood     Status: Normal (Preliminary result)    Specimen: Peripheral Blood   Result Value Ref Range    Culture No growth after 12 hours    CBC with platelets differential     Status: Abnormal    Narrative    The following orders were created for panel order CBC with platelets differential.  Procedure                               Abnormality         Status                     ---------                               -----------         ------                     CBC with platelets and d...[[id number]]  Abnormal            Final result                 Please view results for these tests on the individual orders.     Medications   0.9% sodium chloride BOLUS (0 mLs Intravenous Stopped 3/9/23 0900)   vancomycin (VANCOCIN) 1,250 mg in 0.9% NaCl 250 mL intermittent infusion (0 mg Intravenous Stopped 3/9/23 1136)   ipratropium - albuterol 0.5 mg/2.5 mg/3 mL (DUONEB) neb solution 3 mL (3 mLs Nebulization $Given 3/9/23 1311)   azithromycin (ZITHROMAX) 500 mg in sodium chloride 0.9 % 250 mL intermittent infusion (0 mg Intravenous Stopped 3/9/23 0953)   dextrose 10% BOLUS (0 mLs Intravenous Stopped 3/9/23 1155)   dextrose 50 % injection 25 g (25 g Intravenous $Given 3/9/23 0835)   insulin regular 1 unit/mL injection 5.25 Units (5.25 Units Intravenous $Given 3/9/23 0835)   calcium gluconate 1 g in 50 mL  sodium chloride intermittent infusion (premix) (1 g Intravenous $Given 3/9/23 7526)     Labs Ordered and Resulted from Time of ED Arrival to Time of ED Departure   COMPREHENSIVE METABOLIC PANEL - Abnormal       Result Value    Sodium 141      Potassium 5.7 (*)     Chloride 105      Carbon Dioxide (CO2) 19 (*)     Anion Gap 17 (*)     Urea Nitrogen 70.8 (*)     Creatinine 4.07 (*)     Calcium 9.5      Glucose 200 (*)     Alkaline Phosphatase 112      AST 29      ALT 18      Protein Total 7.0      Albumin 3.9      Bilirubin Total 1.0      GFR Estimate 15 (*)    TROPONIN T, HIGH SENSITIVITY - Abnormal    Troponin T, High Sensitivity 53 (*)    ROUTINE UA WITH MICROSCOPIC REFLEX TO CULTURE - Abnormal    Color Urine Yellow      Appearance Urine Slightly Cloudy (*)     Glucose Urine 70 (*)     Bilirubin Urine Negative      Ketones Urine Negative      Specific Gravity Urine 1.024      Blood Urine Large (*)     pH Urine 5.5      Protein Albumin Urine 70 (*)     Urobilinogen Urine Normal      Nitrite Urine Negative      Leukocyte Esterase Urine Negative      Mucus Urine Present (*)     RBC Urine >182 (*)     WBC Urine 1      Hyaline Casts Urine 21 (*)    LACTIC ACID WHOLE BLOOD - Abnormal    Lactic Acid 2.4 (*)    PROCALCITONIN - Abnormal    Procalcitonin 1.86 (*)    CBC WITH PLATELETS AND DIFFERENTIAL - Abnormal    WBC Count 12.6 (*)     RBC Count 5.05      Hemoglobin 12.9 (*)     Hematocrit 40.7      MCV 81      MCH 25.5 (*)     MCHC 31.7      RDW 21.4 (*)     Platelet Count 298      % Neutrophils 90      % Lymphocytes 6      % Monocytes 4      % Eosinophils 0      % Basophils 0      % Immature Granulocytes 0      NRBCs per 100 WBC 0      Absolute Neutrophils 11.3 (*)     Absolute Lymphocytes 0.7 (*)     Absolute Monocytes 0.5      Absolute Eosinophils 0.0      Absolute Basophils 0.0      Absolute Immature Granulocytes 0.0      Absolute NRBCs 0.0     ISTAT GASES ELECTROLYTES ICA GLUCOSE VENOUS POCT - Abnormal    CPB Applied  No      Hematocrit POCT 40      Calcium, Ionized Whole Blood POCT 5.1      Glucose Whole Blood POCT 198 (*)     Bicarbonate Venous POCT 21      Hemoglobin POCT 13.6      Potassium POCT 5.6 (*)     Sodium POCT 144      pCO2 Venous POCT 40      pO2 Venous POCT 57 (*)     pH Venous POCT 7.34      O2 Sat, Venous POCT 88 (*)    ISTAT GASES LACTATE VENOUS POCT - Abnormal    Lactic Acid POCT 2.2 (*)     Bicarbonate Venous POCT 21      O2 Sat, Venous POCT 88 (*)     pCO2V Venous POCT 40      pH Venous POCT 7.32      pO2 Venous POCT 59 (*)    BLOOD GAS ARTERIAL - Abnormal    pH Arterial 7.32 (*)     pCO2 Arterial 44      pO2 Arterial 34 (*)     FIO2 50      Bicarbonate Arterial 23      Base Excess/Deficit (+/-) -3.6      Roman's Test Yes     POTASSIUM - Abnormal    Potassium 5.5 (*)    LACTIC ACID WHOLE BLOOD - Abnormal    Lactic Acid 4.7 (*)    GLUCOSE BY METER - Abnormal    GLUCOSE BY METER POCT 167 (*)    GLUCOSE BY METER - Abnormal    GLUCOSE BY METER POCT 238 (*)    GLUCOSE BY METER - Abnormal    GLUCOSE BY METER POCT 224 (*)    GLUCOSE BY METER - Abnormal    GLUCOSE BY METER POCT 175 (*)    GLUCOSE BY METER - Abnormal    GLUCOSE BY METER POCT 215 (*)    INFLUENZA A/B, RSV, & SARS-COV2 PCR - Normal    Influenza A PCR Negative      Influenza B PCR Negative      RSV PCR Negative      SARS CoV2 PCR Negative       US Lower Extremity Venous Duplex Bilateral   Final Result   IMPRESSION:   1.  No evidence of deep venous thrombosis in either lower extremity.      ADELE HOLLOWAY MD            SYSTEM ID:  [id number]      CT Abdomen Pelvis w/o Contrast   Final Result   IMPRESSION:    1. No radiographic findings to explain hematuria and acute renal   failure.   2. Multifocal nodular and groundglass opacities at the visualized lung   bases, which is suspicious for infection including atypical   etiologies.   3. Mildly distended small bowel, which can be seen in adynamic ileus   versus early developing obstruction. Several enlarged  mesenteric lymph   nodes are favored to be reactive.   4. Large volume stool in the rectum with mild wall thickening of the   rectum suggestive of constipation and possible stercoral colitis.   5. Prostatomegaly.      I have personally reviewed the examination and initial interpretation   and I agree with the findings.      ADELE HOLLOWAY MD            SYSTEM ID:  [id number]      XR Chest Port 1 View   Final Result   IMPRESSION: Mild diffuse bilateral interstitial opacities, likely   pulmonary edema versus atypical infection.      I have personally reviewed the examination and initial interpretation   and I agree with the findings.      LEO NORMAN MD            SYSTEM ID:  [id number]             Critical care was performed.   Critical Care Addendum  My initial assessment, based on my review of prehospital provider report, review of nursing observations, review of vital signs, focused history and physical exam, established a high suspicion that Vincent Escobar has respiratory distress, which requires immediate intervention, and therefore he is critically ill.     After the initial assessment, the care team initiated intensive non-invasive respiratory support to provide stabilization care. Due to the critical nature of this patient, I reassessed nursing observations, vital signs, physical exam, mental status and respiratory status multiple times prior to his disposition.     Time also spent performing documentation, discussion with family to obtain medical information for decision making, reviewing test results and coordination of care.     Critical care time (excluding teaching time and procedures): 30 minutes.     Assessment & Plan    72-year-old male presents to us with a chief complaint of difficulty breathing.  He had depressed mental status and mild respiratory distress upon arrival.  Patient was able to answer some questions and then immediately went back to sleep.  BiPAP was ordered.  This did seem to  help.  Exam was consistent with a likely infiltrate.  Given his history of aspiration pneumonia antibiotics were ordered.  Labs are otherwise pending at this time.  Patient care is signed out to the oncoming physician Dr. Gilliam.  See her note for further details    I have reviewed the nursing notes. I have reviewed the findings, diagnosis, plan and need for follow up with the patient.    There are no discharge medications for this patient.      Final diagnoses:   Acute respiratory failure with hypoxia (H)   Sepsis with acute renal failure without septic shock, due to unspecified organism, unspecified acute renal failure type (H)       Junior Lassiter  [facility] EMERGENCY DEPARTMENT  3/9/2023     Junior Lassiter,   03/10/23 0037

## 2023-03-10 NOTE — PHARMACY-ADMISSION MEDICATION HISTORY
Admission Medication History Completed by Pharmacy    See [facility] Admission Navigator for allergy information, preferred outpatient pharmacy, prior to admission medications and immunization status.     Medication History Sources:     Patient is Inpatient Hospice    MAR from [facility]    Changes made to PTA medication list (reason):    Added: All meds added per MAR from [facility]    Deleted: None    Changed: None    Prior to Admission medications    Medication Sig Last Dose Taking? Auth Provider Long Term End Date   acetaminophen (TYLENOL) 325 MG tablet Take 650 mg by mouth every 6 hours as needed for fever or pain PRN Yes Unknown, Entered By History     amantadine HCl 100 MG TABS Take 1 tablet by mouth daily 3/8/2023 Yes Unknown, Entered By History     amiodarone (PACERONE) 200 MG tablet Take 200 mg by mouth daily 3/8/2023 Yes Unknown, Entered By History Yes    atorvastatin (LIPITOR) 40 MG tablet Take 40 mg by mouth daily 3/8/2023 Yes Unknown, Entered By History Yes    buPROPion (WELLBUTRIN SR) 200 MG 12 hr tablet Take 200 mg by mouth 2 times daily 3/8/2023 Yes Unknown, Entered By History Yes    carbidopa-levodopa (SINEMET)  MG tablet Take 1 tablet by mouth 3 times daily 3/8/2023 Yes Unknown, Entered By History Yes    DULoxetine HCl 40 MG CPEP Take 1 capsule by mouth daily 3/8/2023 Yes Unknown, Entered By History Yes    lisinopril (ZESTRIL) 10 MG tablet Take 10 mg by mouth daily 3/9/2023 Yes Unknown, Entered By History Yes    metFORMIN (GLUCOPHAGE) 1000 MG tablet Take 1,000 mg by mouth 2 times daily (with meals) 3/8/2023 Yes Unknown, Entered By History Yes    metoprolol succinate ER (TOPROL XL) 50 MG 24 hr tablet Take 25 mg by mouth daily 3/8/2023 Yes Unknown, Entered By History Yes    nicotine (NICODERM CQ) 14 MG/24HR 24 hr patch Place 1 patch onto the skin every 24 hours 3/8/2023 Yes Unknown, Entered By History     OLANZapine (ZYPREXA) 5 MG tablet Take 5 mg by mouth At Bedtime 3/8/2023  Yes Unknown, Entered By History     omeprazole (PRILOSEC) 10 MG DR capsule Take 10 mg by mouth daily 3/8/2023 Yes Unknown, Entered By History     Pregabalin (LYRICA) 200 MG capsule Take 200 mg by mouth 2 times daily 3/8/2023 Yes Unknown, Entered By History Yes        Date completed: 03/10/23    Medication history completed by: Kyle Lozoya, PharmD

## 2023-03-10 NOTE — PROGRESS NOTES
"    [facility]    Progress Note - Medicine Service, NEWT[facility] TEAM 1       Date of Admission:  3/9/2023    Assessment & Plan   Vincent Escobar is a 72 year old male who is being transitioned to inpatient hospice on 03/09/23. Please see the discharge summary from the same date for more details of his hospital course.     # Acute hypoxic respiratory failure  # Progressive supranuclear palsy  # Alzheimer's   # Parkinson's disease   # Cachexia    # Goals of care  Multifocal nodular and groundglass opacities at the lung bases on CT concerning for infection. Per patient's wife there has been some concern that he has been aspirating.  LE US negative for DVT. Given plan to initiate hospice this upcoming Tuesday, wife would like to enroll patient in hospice tonight after daughter able to come to hospital. BiPAP was discontinued in the evening of 3/9.   - Comfort care orders       Diet: Regular Diet Adult    Prieto Catheter: Not present  Lines: None     Cardiac Monitoring: None  Code Status: No CPR- Do NOT Intubate      Clinically Significant Risk Factors Present on Admission        # Hyperkalemia: Highest K = 5.7 mmol/L in last 2 days, will monitor as appropriate           # Hypertension: home medication list includes antihypertensive(s)   # Acute Respiratory Failure: based on blood gas results.  Continue supplemental oxygen as needed     # Cachexia: Estimated body mass index is 17.08 kg/m  as calculated from the following:    Height as of an earlier encounter on 3/9/23: 1.753 m (5' 9.02\").    Weight as of an earlier encounter on 3/9/23: 52.5 kg (115 lb 11.9 oz).           Disposition Plan      Expected Discharge Date: 03/10/2023                The patient's care was discussed with the Attending Physician, Dr. Live.    Merle Garcia MD  Medicine Service, [facility] TEAM 1  [facility]  Securely message with [employer] (more info)  Text page " via [employer] Paging/Directory   See signed in provider for up to date coverage information  ______________________________________________________________________    Interval History   Some agitation earlier in the night, appeared more comfortable later with pain medication. Family very appreciative of support.     Physical Exam   Vital Signs:           Resp: 23   O2 Device: None (Room air)    Weight: 0 lbs 0 oz    General: appears comfortable in bed     Data   None

## 2023-03-10 NOTE — PROGRESS NOTES
[facility]    Social Work Progress Note - [facility]    ______________________________________________________________________    [facility]  Contact Number: [phone]    - Providers: Please contact [facility] with changes in orders or clinical plan of care   - Nursing: Please contact [facility] with significant changes in patient condition  - Social Work: Please contact [facility] for discharge phanning/updates  ______________________________________________________________________      Summary of Visit (includes psychosocial assessment/updates, acceptance of palliative care/hospice philosophy, discharge plans):     THIS IS A ROUTINE  VISIT. SW AND CNL NENA GREEN WERE PRESENT.  SW ASSESSED FOR PSYCHOLOGICAL/ EMOTIONAL BARRIERS AND PROTECTIVE FACTORS WITHIN THE FAMILY. DAUGHTER PROCESSED HER MOTHER IS EXPERIENCING THE 1 YEAR ANNIVERSARY OF HER SISTER PASSING AND TWO YEARS AGO MOM'S MOTHER . SW VALIDATED THEIR FEELINGS AND ACKNOWLEDGED COMPLEX GRIEF AND ANTICIPATORY GRIEF WITH THEIR FATHER NOW BEING IN HOSPICE WITH DECLINING SYMPTOMS.    SW ALSO TOOK ELECTRONIC COPIES OF POA AND ADVANCE DIRECTIVE AND SENT TO [facility] INTAKE TO PUT IN VINCENT'S RECORD OF CARE     Interventions and response to Interventions (short-term counseling, family conference, education/resources offered to the family):    FAMILY WAS APPRECIATIVE OF EMOTIONAL SUPPORT AND ACCEPTED THE INTERVENTIONS AND FURTHERING QUESTIONS FROM     Plan of Care Discussed with the Following:   - Nurse: CHRISTA FLORES, YASMINE  - Hospitalist/Rounding Provider: Dewayne Live MD    - Vincent's Family/Preferred Contact: DAUGHTER PHI AND WIFE SIM  - Hospice Provider: DR FRANCESCA ESCALANTE.       EVIE Vu, [facility]  Hospice Social Worker  [facility]  Monday -Friday 8AM-5PM  Work number: [phone]  [facility]   line:  [phone]  [email or web address]  [email or web address]

## 2023-03-10 NOTE — CONSULTS
"[facility]    Progress Note - [facility]    ______________________________________________________________________    [facility] 24/7 Contact Number: [phone]    - Providers: Please contact [facility] with changes in orders or clinical plan of care   - Nursing: Please contact [facility] with significant changes in patient condition  ______________________________________________________________________        Plan of Care Discussed with the Following:   - Nurse:Ayana  - Hospitalist/Rounding Provider: Dr. Garcia   - Patient's Family/Preferred Contact: DaughterRosalia and spouse Rebecca  - Hospice Provider: Dr. Armando Laguna    [facility] Eligibility: ARDS/terminal agitation    Co-visit with AC/ MSW Paty Enciso.    Education provided: Rosalia Salas is ICU Charge RN, works at [employer] and is very familiar with EOL signs/medications for symptom management and feels comfortable to advocate when PRN's are needed.  Stated that the hospital RN's are doing a \"beautiful job with frequent assessments.\"      Emotional and grief support provided.      Pertinent assessment information: Agonal breathing with audible terminal congestion.    Please continue to use PRN's for EOL symptom management as ordered.  Goal is to keep RR at or < 22.      Thank you for your partnership and the compassionate care you are providing to this patient and family.    Please reach out to cell listed below between 10 am-6 pm with any questions or concerns.    VINICIO Rose, RN  Clinical Nurse Liaison I [facility]  Cell: [phone]  [facility]  Office: [phone]  Email [email or web address]      Reema Vidal RN  [facility]  Contact information available via [facility] Paging/Directory     "

## 2023-03-10 NOTE — PLAN OF CARE
Pt continues on inpatient hospice.  Respirations in 20s, no other vitals checked. Family initially wanted to wait until pt was in pain before administering pain medications despite education. Around 0130 pt became agitated and restless, family agreeable to pain plan of every 1-2 hours. Pt seemed more comfortable after pain medication. Dilaudid x4 given and ativan x2 given. Pt unresponsive most of night except during period of agitation. Family does not want scheduled repositioning, repositioned once overnight.  Bright red blood noted in pull up. Continue plan of care and to assess pt and family's needs.     Problem: End-of-Life Care  Goal: Comfort, Peace and Preserved Dignity  Outcome: Progressing   Goal Outcome Evaluation:

## 2023-03-11 NOTE — PLAN OF CARE
Pt enrolled in inpatient hospice. Pt unresponsive, groans and jerks when in pain and/or agitated. Pt receiving 0.5mg IV dilaudid approximately q 2 hrs. Pt requiring pain meds more frequently this evening to settle agitation and moaning. Ativan x2. Consider continuous dilaudid drip if agitation/pain continues. Increased secretions this shift, robinul q 4 hrs with little effect. Breathing is becoming more apneic and agonal as shift continues, pt on RA. Repositioning per family request. Reposition once this afternoon. No urine output, no stool output. Family at bedside all shift, family will sleep over tonight. Continue to monitor.   Problem: End-of-Life Care  Goal: Comfort, Peace and Preserved Dignity  Outcome: Progressing   Goal Outcome Evaluation:

## 2023-03-11 NOTE — DISCHARGE SUMMARY
[facility]  Discharge Summary - Medicine & Pediatrics       Date of Admission:  3/9/2023  Date of Discharge:  3/9/2023  5:24 PM  Discharging Provider: DR. Live   Discharge Service: Medicine Service, BRID[facility] TEAM 1    Discharge Diagnoses   # Acute hypoxic respiratory failure  # Progressive supranuclear palsy  # Alzheimer's   # Parkinson's disease   # Cachexia    # Goals of care         Follow-ups Needed After Discharge       Unresulted Labs Ordered in the Past 30 Days of this Admission     Date and Time Order Name Status Description    3/9/2023  6:51 AM Blood Culture Peripheral Blood Preliminary     3/9/2023  6:51 AM Blood Culture Peripheral Blood Preliminary       These results will be followed up by PCP    Discharge Disposition   Patient  during this admission  Condition at discharge:       Hospital Course   Vincent Escobar is a 72 year old male who is being transitioned to inpatient hospice on 23. Please see the discharge summary from the same date for more details of his hospital course.     # Acute hypoxic respiratory failure  # Progressive supranuclear palsy  # Alzheimer's   # Parkinson's disease   # Cachexia    # Goals of care  Multifocal nodular and groundglass opacities at the lung bases on CT concerning for infection. Per patient's wife there has been some concern that he has been aspirating.  LE US negative for DVT. Given plan to initiate hospice this upcoming Tuesday, wife would like to enroll patient in hospice tonight after daughter able to come to hospital. BiPAP was discontinued in the evening of 3/9 and passed  on 3/10.          Consultations This Hospital Stay   PHARMACY TO DOSE VANCO  [facility] ADULT CONSULT  [facility] ADULT CONSULT  PHARMACY IP CONSULT    Code Status   Prior       The patient was discussed with MD Bridget Andre 1 Service  [facility] [address] B[address]  [address] [address]  Phone: [phone]  Fax: [phone]  ______________________________________________________________________    Physical Exam   Vital Signs:                    Weight: 115 lbs 11.86 oz   Death exam completed by Dr. Zak Pineda as below:   Physical Exam: Unresponsive to noxious stimuli, Spontaneous respirations absent, Breath sounds absent, Carotid pulse absent, Heart sounds absent, Pupillary light reflex absent and Corneal blink reflex absent     Patient was pronounced dead at 8:40 PM, March 10, 2023.      Primary Care Physician   TIANNA SYLVESTER    Discharge Orders   No discharge procedures on file.    Significant Results and Procedures   Results for orders placed or performed during the hospital encounter of 03/09/23   XR Chest Port 1 View    Narrative    EXAM: XR CHEST PORT 1 VIEW  3/9/2023 7:24 AM      HISTORY: dyspnea    COMPARISON: None.    FINDINGS: Portable semiupright AP radiograph of the chest. The trachea  is midline. The cardiac silhouette is not enlarged. Atherosclerotic  calcification of the aortic arch. No pleural effusion or pneumothorax.  Mild diffuse bilateral interstitial opacities. The upper abdomen is  unremarkable.       Impression    IMPRESSION: Mild diffuse bilateral interstitial opacities, likely  pulmonary edema versus atypical infection.    I have personally reviewed the examination and initial interpretation  and I agree with the findings.    LEO NORMAN MD         SYSTEM ID:  [id number]   US Lower Extremity Venous Duplex Bilateral    Narrative    EXAMINATION: DOPPLER VENOUS ULTRASOUND OF BILATERAL LOWER EXTREMITIES,  3/9/2023 9:48 AM     COMPARISON: None.    HISTORY: Edema    TECHNIQUE:  Gray-scale evaluation with compression, spectral flow and  color Doppler assessment of the deep venous system of both legs from  groin to knee, and then at the ankles.    FINDINGS:  Right: the common femoral, femoral, popliteal and  posterior tibial  veins demonstrate normal compressibility and blood flow.    Left: the common femoral, femoral, popliteal and posterior tibial  veins demonstrate normal compressibility and blood flow.      Impression    IMPRESSION:  1.  No evidence of deep venous thrombosis in either lower extremity.    ADELE HOLLOWAY MD         SYSTEM ID:  [id number]   CT Abdomen Pelvis w/o Contrast    Narrative    EXAMINATION: CT ABDOMEN PELVIS W/O CONTRAST, 3/9/2023 9:28 AM    TECHNIQUE:  Helical CT images from the lung bases through the  symphysis pubis were obtained without contrast.  Coronal reformatted  images were generated at a workstation for further assessment.    COMPARISON: None.    HISTORY: hematuria, ARF    FINDINGS:    Devices: None.    Abdomen and pelvis:   Liver: No suspicious liver lesions. No intrahepatic biliary dilation.    Gallbladder: Hyperdense material within the gallbladder lumen, likely  due to vicarious excretion of contrast. No wall thickening to suggest  cholecystitis..    Spleen: Normal size.    Pancreas: No suspicious pancreatic lesions. The pancreatic duct is not  dilated.    Adrenal glands: Thickened appearance of the left adrenal gland without  focal nodularity.    Kidneys: No kidney masses. No hydronephrosis or obstructing renal  stones.    Bladder: Unremarkable.     Pelvic organs: Prostatomegaly.    Gastrointestinal tract: Mild wall thickening of the rectum. Several  fluid and air distended loops of small bowel in the left hemiabdomen  measuring up to 3.2 cm. The appendix is nonvisualized.    Lymph nodes: Several enlarged mesenteric lymph nodes. For example, 1.7  x 1.2 cm node in the right lower quadrant (series 2, image 103)..    Peritoneum/mesentery: No free fluid. No free air..    Vessels: Severe atherosclerotic disease.    Heart and lung bases: Multifocal nodular and groundglass opacities  throughout the visualized lung bases. No pleural effusion or  pneumothorax. Anemic blood pool.    Bones  and soft tissues: No acute or suspicious osseous lesions. Severe  degenerative changes of the lumbar spine, notably at L4-5 with  complete loss of the intervertebral disc space. No suspicious soft  tissue findings.       Impression    IMPRESSION:   1. No radiographic findings to explain hematuria and acute renal  failure.  2. Multifocal nodular and groundglass opacities at the visualized lung  bases, which is suspicious for infection including atypical  etiologies.  3. Mildly distended small bowel, which can be seen in adynamic ileus  versus early developing obstruction. Several enlarged mesenteric lymph  nodes are favored to be reactive.  4. Large volume stool in the rectum with mild wall thickening of the  rectum suggestive of constipation and possible stercoral colitis.  5. Prostatomegaly.    I have personally reviewed the examination and initial interpretation  and I agree with the findings.    ADELE HOLLOWAY MD         SYSTEM ID:  [id number]       Discharge Medications   There are no discharge medications for this patient.    Allergies   Not on File

## 2023-03-11 NOTE — DEATH PRONOUNCEMENT
MD DEATH PRONOUNCEMENT    Called to pronounce Vincent Escobar dead.    Physical Exam: Unresponsive to noxious stimuli, Spontaneous respirations absent, Breath sounds absent, Carotid pulse absent, Heart sounds absent, Pupillary light reflex absent and Corneal blink reflex absent    Patient was pronounced dead at 8:40 PM, March 10, 2023.    Preliminary Cause of Death: Acute hypoxemic respiratory failure    Active Problems:    * No active hospital problems. *       Infectious disease present?: NO    Communicable disease present? (examples: HIV, chicken pox, TB, Ebola, CJD) :  NO    Multi-drug resistant organism present? (example: MRSA): NO    Please consider an autopsy if any of the following exist:  NO Unexpected or unexplained death during or following any dental, medical, or surgical diagnostic treatment procedures.   NO Death of mother at or up to seven days after delivery.     NO All  and pediatric deaths.     NO Death where the cause is sufficiently obscure to delay completion of the death certificate.   NO Deaths in which autopsy would confirm a suspected illness/condition that would affect surviving family members or recipients of transplanted organs.     The following deaths must be reported to the 's Office:  NO A death that may be due entirely or in part to any factors other than natural disease (recent surgery, recent trauma, suspected abuse/neglect).   NO A death that may be an accident, suicide, or homicide.     NO Any sudden, unexpected death in which there is no prior history of significant heart disease or any other condition associated with sudden death.   NO A death under suspicious, unusual, or unexpected circumstances.    NO Any death which is apparently due to natural causes but in which the  does not have a personal physician familiar with the patient s medical history, social, or environmental situation or the circumstances of the terminal event.   NO Any death  apparently due to Sudden Infant Death Syndrome.     NO Deaths that occur during, in association with, or as consequences of a diagnostic, therapeutic, or anesthetic procedure.   NO Any death in which a fracture of a major bone has occurred within the past (6) six months.   NO A death of persons note seen by their physician within 120 days of demise.     NO Any death in which the  was an inmate of a public institution or was in the custody of Law Enforcement personnel.   NO  All unexpected deaths of children   NO Solid organ donors   NO Unidentified bodies   NO Deaths of persons whose bodies are to be cremated or otherwise disposed of so that the bodies will later be unavailable for examination;   NO Deaths unattended by a physician outside of a licensed healthcare facility or licensed residential hospice program   NO Deaths occurring within 24 hours of arrival to a health care facility if death is unexpected.    NO Deaths associated with the decedent s employment.   NO Deaths attributed to acts of terrorism.   NO Any death in which there is uncertainty as to whether it is a medical examiner s care should be discussed with the medical investigator.        Body disposition: Autopsy was discussed with family member:  Spouse in person.  Permission for autopsy was declined.

## 2023-03-14 LAB
BACTERIA BLD CULT: NO GROWTH
BACTERIA BLD CULT: NO GROWTH
